# Patient Record
Sex: FEMALE | Race: WHITE
[De-identification: names, ages, dates, MRNs, and addresses within clinical notes are randomized per-mention and may not be internally consistent; named-entity substitution may affect disease eponyms.]

---
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
## 2022-10-17 ENCOUNTER — TELEPHONE (OUTPATIENT)
Dept: OBSTETRICS AND GYNECOLOGY | Facility: CLINIC | Age: 22
End: 2022-10-17

## 2022-10-17 NOTE — TELEPHONE ENCOUNTER
Pt left VM. She states she needs to schedule her annual and she needs to schedule a NOB appt as well.     Attempted to returned pt's call. THAISCB.

## 2022-10-19 ENCOUNTER — OFFICE VISIT (OUTPATIENT)
Dept: OBSTETRICS AND GYNECOLOGY | Facility: CLINIC | Age: 22
End: 2022-10-19

## 2022-10-19 VITALS — SYSTOLIC BLOOD PRESSURE: 100 MMHG | DIASTOLIC BLOOD PRESSURE: 70 MMHG

## 2022-10-19 DIAGNOSIS — Z34.90 PREGNANCY, UNSPECIFIED GESTATIONAL AGE: Primary | ICD-10-CM

## 2022-10-19 PROBLEM — O14.90 PREECLAMPSIA: Status: RESOLVED | Noted: 2021-08-12 | Resolved: 2022-10-19

## 2022-10-19 PROCEDURE — 99213 OFFICE O/P EST LOW 20 MIN: CPT | Performed by: OBSTETRICS & GYNECOLOGY

## 2022-10-19 NOTE — PROGRESS NOTES
Chief Complaint   Patient presents with   • Amenorrhea     + UPT          HPI  Jessica Toussaint is a 22 y.o. female, , who presents with a positive UPT with LMP the first week of August.  She denies missing OCP's and had 2 + pregnancy tests last week.  She has a h/o preeclampsia. She denies n/v, vaginal bleeding, and cramping.     Recent Tests:  She had a urine pregnancy test that was done 1 week ago that was positive..    US today: Yes.  Findings showed viable iup jennifer. 5w6d, FHR of 101.  I have personally evaluated the U/S and agree with the findings.   She has not had prenatal care.  She denies associated abdominal or pelvic pain..  RH:positive       The additional following portions of the patient's history were reviewed and updated as appropriate: allergies, current medications, past family history, past medical history, past social history, past surgical history and problem list.    Review of Systems   Constitutional: Negative.    HENT: Negative.    Eyes: Negative.    Respiratory: Negative.    Cardiovascular: Negative.    Gastrointestinal: Negative.    Endocrine: Negative.    Genitourinary: Negative.    Musculoskeletal: Negative.    Skin: Negative.    Allergic/Immunologic: Negative.    Neurological: Negative.    Hematological: Negative.    Psychiatric/Behavioral: Negative.      All other systems reviewed and are negative.     I have reviewed and agree with the HPI, ROS, and historical information as entered above. Linda Macias MD    Objective   /70   Breastfeeding No     Physical Exam  Vitals and nursing note reviewed.   Constitutional:       Appearance: Normal appearance.   HENT:      Head: Normocephalic and atraumatic.   Eyes:      General: No scleral icterus.     Pupils: Pupils are equal, round, and reactive to light.   Pulmonary:      Effort: Pulmonary effort is normal.      Breath sounds: Normal breath sounds.   Abdominal:      General: Bowel sounds are normal. There is no distension.       Palpations: Abdomen is soft.      Tenderness: There is no abdominal tenderness.   Musculoskeletal:         General: Normal range of motion.      Cervical back: Normal range of motion and neck supple.      Right lower leg: No edema.      Left lower leg: No edema.   Skin:     General: Skin is warm and dry.   Neurological:      General: No focal deficit present.      Mental Status: She is alert and oriented to person, place, and time.   Psychiatric:         Mood and Affect: Mood normal.         Behavior: Behavior normal. Behavior is cooperative.            Assessment and Plan    Problem List Items Addressed This Visit     Pregnancy - Primary    Relevant Orders    US Ob Transvaginal    US Ob Transvaginal       1. Early OB- +UPT at home and lmp in early august. 5w6d today by CRL. Will FU in 2 weeks for repeat US and nob visit.   2. Repeat U/S in 2 week(s).  Return in about 2 weeks (around 11/2/2022) for F/U Prenatal, U/S Next Visit.    I spent 20 minutes caring for Jessica on this date of service. This time includes time spent by me in the following activities:preparing for the visit, reviewing tests, obtaining and/or reviewing a separately obtained history and documenting information in the medical record        Linda Macias MD  10/19/2022

## 2022-11-03 ENCOUNTER — TELEPHONE (OUTPATIENT)
Dept: OBSTETRICS AND GYNECOLOGY | Facility: CLINIC | Age: 22
End: 2022-11-03

## 2022-11-03 ENCOUNTER — HOSPITAL ENCOUNTER (EMERGENCY)
Age: 22
Discharge: HOME | End: 2022-11-03
Payer: MEDICAID

## 2022-11-03 VITALS — HEART RATE: 96 BPM | DIASTOLIC BLOOD PRESSURE: 70 MMHG | SYSTOLIC BLOOD PRESSURE: 113 MMHG

## 2022-11-03 VITALS
TEMPERATURE: 98.4 F | HEART RATE: 96 BPM | DIASTOLIC BLOOD PRESSURE: 81 MMHG | OXYGEN SATURATION: 99 % | RESPIRATION RATE: 18 BRPM | SYSTOLIC BLOOD PRESSURE: 124 MMHG

## 2022-11-03 VITALS
RESPIRATION RATE: 18 BRPM | SYSTOLIC BLOOD PRESSURE: 113 MMHG | HEART RATE: 85 BPM | OXYGEN SATURATION: 96 % | DIASTOLIC BLOOD PRESSURE: 66 MMHG

## 2022-11-03 VITALS
RESPIRATION RATE: 18 BRPM | DIASTOLIC BLOOD PRESSURE: 62 MMHG | HEART RATE: 70 BPM | TEMPERATURE: 98.06 F | SYSTOLIC BLOOD PRESSURE: 112 MMHG | OXYGEN SATURATION: 98 %

## 2022-11-03 VITALS
SYSTOLIC BLOOD PRESSURE: 126 MMHG | RESPIRATION RATE: 17 BRPM | DIASTOLIC BLOOD PRESSURE: 69 MMHG | OXYGEN SATURATION: 96 % | HEART RATE: 68 BPM

## 2022-11-03 VITALS
RESPIRATION RATE: 20 BRPM | DIASTOLIC BLOOD PRESSURE: 68 MMHG | OXYGEN SATURATION: 98 % | HEART RATE: 68 BPM | SYSTOLIC BLOOD PRESSURE: 115 MMHG

## 2022-11-03 VITALS — DIASTOLIC BLOOD PRESSURE: 81 MMHG | HEART RATE: 95 BPM | SYSTOLIC BLOOD PRESSURE: 124 MMHG

## 2022-11-03 VITALS — BODY MASS INDEX: 28 KG/M2

## 2022-11-03 VITALS — SYSTOLIC BLOOD PRESSURE: 110 MMHG | DIASTOLIC BLOOD PRESSURE: 66 MMHG

## 2022-11-03 DIAGNOSIS — O20.0: Primary | ICD-10-CM

## 2022-11-03 DIAGNOSIS — Z3A.01: ICD-10-CM

## 2022-11-03 DIAGNOSIS — O20.9 VAGINAL BLEEDING IN PREGNANCY, FIRST TRIMESTER: Primary | ICD-10-CM

## 2022-11-03 LAB
HCT VFR BLD CALC: 35.2 % (ref 37–47)
HGB BLD-MCNC: 11.8 G/DL (ref 12.2–16.2)
MCHC RBC-ENTMCNC: 33.5 G/DL (ref 31.8–35.4)
MCV RBC: 77.1 FL (ref 81–99)
MEAN CORPUSCULAR HEMOGLOBIN: 25.8 PG (ref 27–31.2)
PLATELET # BLD: 244 K/MM3 (ref 142–424)
RBC # BLD AUTO: 4.56 M/MM3 (ref 4.2–5.4)
WBC # BLD AUTO: 5.4 K/MM3 (ref 4.5–13)

## 2022-11-03 PROCEDURE — 76817 TRANSVAGINAL US OBSTETRIC: CPT

## 2022-11-03 PROCEDURE — 36415 COLL VENOUS BLD VENIPUNCTURE: CPT

## 2022-11-03 PROCEDURE — 86900 BLOOD TYPING SEROLOGIC ABO: CPT

## 2022-11-03 PROCEDURE — 84702 CHORIONIC GONADOTROPIN TEST: CPT

## 2022-11-03 PROCEDURE — 86901 BLOOD TYPING SEROLOGIC RH(D): CPT

## 2022-11-03 PROCEDURE — 85025 COMPLETE CBC W/AUTO DIFF WBC: CPT

## 2022-11-03 PROCEDURE — 99283 EMERGENCY DEPT VISIT LOW MDM: CPT

## 2022-11-03 NOTE — PC.NURSE
rounded on pt in the room asnd asked if they needed anything, pt stated that they had no needs at this time.

## 2022-11-03 NOTE — TELEPHONE ENCOUNTER
Pt transferred from the HUB concerning her ED visit this morning. She states she was seen at  with [facility] 10/19 and was told she was 5w6d. She states that she was seen in the ED due to have some back pain, abdominal pain when picking up her son, and the this morning she had brown/red vaginal discharge. She reports that she had an U/S and was told that she is measuring 6w1d, and that there were no FHT. She states she is still having brown/red discharge, denies it is filling a pad in <1hr and pelvic pain.     Called requested documentation from [facility], states ED note is not finished, but will fax over support documentation.     Attempted to schedule pt in [place] office today or tomorrow, pt states she does not have transportation. Scheduled pt for tomorrow with KATHY Hawkins with U/S tomorrow at 10:30.

## 2022-11-04 ENCOUNTER — OFFICE VISIT (OUTPATIENT)
Dept: OBSTETRICS AND GYNECOLOGY | Facility: CLINIC | Age: 22
End: 2022-11-04

## 2022-11-04 DIAGNOSIS — O02.1 MISSED ABORTION: Primary | ICD-10-CM

## 2022-11-04 PROCEDURE — 99213 OFFICE O/P EST LOW 20 MIN: CPT | Performed by: NURSE PRACTITIONER

## 2022-11-04 RX ORDER — MISOPROSTOL 200 UG/1
TABLET ORAL
Qty: 8 TABLET | Refills: 0 | Status: SHIPPED | OUTPATIENT
Start: 2022-11-04 | End: 2022-11-11 | Stop reason: SDUPTHER

## 2022-11-04 NOTE — PROGRESS NOTES
Vaginal spotting F/U ER        HPI  Jessica Toussaint is a 22 y.o. female, , who presents with spotting and F/U ER visit at [facility].    Recent Tests:    US today: Yes.  Findings showed non viable IUP with absent FHR.  I have personally evaluated the U/S and agree with the findings. Shyanne Brand, KATHY  She has not had prenatal care.  She denies associated abdominal or pelvic pain.. Her past medical history is non-contributory.  She does not have passage of tissue.  Rh Status: Positive  She reports no additional symptoms or complaints.    The additional following portions of the patient's history were reviewed and updated as appropriate: allergies, current medications, past family history, past medical history, past social history, past surgical history and problem list.    Review of Systems   Constitutional: Negative.    Respiratory: Negative.    Cardiovascular: Negative.    Gastrointestinal: Negative.    Genitourinary:        Vaginal spotting     All other systems reviewed and are negative.     I have reviewed and agree with the HPI, ROS, and historical information as entered above. Shyanne Brand, APRN    Objective   There were no vitals taken for this visit.    Physical Exam  Constitutional:       Appearance: Normal appearance.   Pulmonary:      Effort: Pulmonary effort is normal.   Neurological:      Mental Status: She is alert.            Assessment and Plan    Problem List Items Addressed This Visit        Gravid and     Missed  - Primary    Overview     U/S 10/19/22 showed 5w6d IUP with + FHR. U/S 22 showed no growth with absent FHR. Options reviewed, pt opts for cytotec. F/U 1 week with U/S. RH positive.         Relevant Orders    US Ob Transvaginal       1. Missed AB  Repeat U/S in 1 week(s).  Pelvic Rest.  No douching, intercourse or use of tampons.  Options discussed with patient.  Cytotec prescribed.  Bleeding precautions reviewed.  Report to ED if  saturating a pad greater than every 30-60 mins.  Follow Up: Return in about 1 week (around 11/11/2022) for U/S.  Return in about 1 week (around 11/11/2022) for U/S.    Counseling was given to patient for the following topics: diagnostic results, instructions for management, risks and benefits of treatment options and importance of treatment compliance . Total time of the encounter was 20 minutes and 15 minutes was spend counseling.      Shyanne Brand, APRN  11/04/2022

## 2022-11-11 ENCOUNTER — OFFICE VISIT (OUTPATIENT)
Dept: OBSTETRICS AND GYNECOLOGY | Facility: CLINIC | Age: 22
End: 2022-11-11

## 2022-11-11 VITALS
HEIGHT: 66 IN | BODY MASS INDEX: 18 KG/M2 | DIASTOLIC BLOOD PRESSURE: 69 MMHG | SYSTOLIC BLOOD PRESSURE: 124 MMHG | OXYGEN SATURATION: 98 % | RESPIRATION RATE: 18 BRPM | HEART RATE: 88 BPM | WEIGHT: 112 LBS

## 2022-11-11 DIAGNOSIS — O02.1 MISSED ABORTION: Primary | ICD-10-CM

## 2022-11-11 PROCEDURE — 99213 OFFICE O/P EST LOW 20 MIN: CPT | Performed by: NURSE PRACTITIONER

## 2022-11-11 RX ORDER — MISOPROSTOL 200 UG/1
TABLET ORAL
Qty: 4 TABLET | Refills: 0 | Status: SHIPPED | OUTPATIENT
Start: 2022-11-11 | End: 2022-12-07

## 2022-11-11 NOTE — PROGRESS NOTES
"    Chief Complaint   Patient presents with   • Miscarriage          HPI  Jessica Toussaint is a 22 y.o. female, , who presents with miscarriage.    Recent Tests:  She missed AB.    US today: Yes.  Findings showed no definite GS, endometrium thickened, probable RPOC.  I have personally evaluated the U/S and agree with the findings. Shyanne Brand, APRN  She has not had prenatal care.  She denies associated abdominal or pelvic pain.. Her past medical history is non-contributory.  She has passage of tissue.  Rh Status: Positive  She reports no additional symptoms or complaints.    The additional following portions of the patient's history were reviewed and updated as appropriate: allergies, current medications, past family history, past medical history, past social history, past surgical history and problem list.    Review of Systems   Constitutional: Negative.    Respiratory: Negative.    Cardiovascular: Negative.    Gastrointestinal: Negative.    Genitourinary: Positive for vaginal bleeding.     All other systems reviewed and are negative.     I have reviewed and agree with the HPI, ROS, and historical information as entered above. Shyanne Brand, APRN    Objective   /69   Pulse 88   Resp 18   Ht 167.6 cm (65.98\")   Wt 50.8 kg (112 lb)   SpO2 98%   BMI 18.09 kg/m²     Physical Exam  Constitutional:       Appearance: Normal appearance.   Pulmonary:      Effort: Pulmonary effort is normal.   Neurological:      Mental Status: She is alert.            Assessment and Plan    Problem List Items Addressed This Visit        Gravid and     Missed  - Primary    Overview     U/S 10/19/22 showed 5w6d IUP with + FHR. U/S 22 showed no growth with absent FHR. Options reviewed, pt opts for cytotec. F/U 1 week with U/S. RH positive.  F/U U/S- possible RPOC but no definitive GS. She only did one dose of cytotec. Plan to repeat that this weekend and discussed taking 2nd dose if unsure that " everything passed. HCG today, repeat U/S 1 week.          Relevant Orders    HCG, B-subunit, Quantitative    US Ob Transvaginal       1. Missed AB  2. Repeat U/S in 1 week(s).  3. Pelvic Rest.  No douching, intercourse or use of tampons.  4. Call for an increase in bleeding, abdominal pain, or fever.  5. Cytotec prescribed.  Bleeding precautions reviewed.  6. Report to ED if saturating a pad greater than every 30-60 mins.  Return in about 1 week (around 11/18/2022) for U/S MAB F/U.    Counseling was given to patient for the following topics: diagnostic results, instructions for management, risk factor reductions, prognosis, risks and benefits of treatment options and importance of treatment compliance . Total time of the encounter was 20 minutes and 15 minutes was spend counseling.      Shyanne Brand, APRN  11/11/2022

## 2022-11-12 LAB — HCG INTACT+B SERPL-ACNC: NORMAL MIU/ML

## 2022-11-17 ENCOUNTER — TELEPHONE (OUTPATIENT)
Dept: OBSTETRICS AND GYNECOLOGY | Facility: CLINIC | Age: 22
End: 2022-11-17

## 2022-11-17 ENCOUNTER — DOCUMENTATION (OUTPATIENT)
Dept: OBSTETRICS AND GYNECOLOGY | Facility: CLINIC | Age: 22
End: 2022-11-17

## 2022-11-17 NOTE — TELEPHONE ENCOUNTER
Attempted to call pt concerning rescheduling her appt. Phone number not in service. Unable to leave .

## 2022-11-17 NOTE — PROGRESS NOTES
Tried to call pt number is not working tried to call significant other no answer no VM need to reschedule missed appt

## 2022-11-18 NOTE — TELEPHONE ENCOUNTER
Attempted to call pt x2. 'This number you dialed is not a working number.'     Letter sent to patient at address on file.

## 2022-12-06 ENCOUNTER — HOSPITAL ENCOUNTER (EMERGENCY)
Age: 22
Discharge: HOME | End: 2022-12-06
Payer: MEDICAID

## 2022-12-06 VITALS
HEART RATE: 87 BPM | OXYGEN SATURATION: 98 % | RESPIRATION RATE: 19 BRPM | TEMPERATURE: 98.4 F | DIASTOLIC BLOOD PRESSURE: 74 MMHG | SYSTOLIC BLOOD PRESSURE: 121 MMHG

## 2022-12-06 VITALS
DIASTOLIC BLOOD PRESSURE: 74 MMHG | RESPIRATION RATE: 19 BRPM | TEMPERATURE: 98.42 F | HEART RATE: 87 BPM | OXYGEN SATURATION: 98 % | SYSTOLIC BLOOD PRESSURE: 121 MMHG

## 2022-12-06 VITALS — BODY MASS INDEX: 20.7 KG/M2

## 2022-12-06 DIAGNOSIS — H10.9: Primary | ICD-10-CM

## 2022-12-06 PROCEDURE — 99212 OFFICE O/P EST SF 10 MIN: CPT

## 2022-12-07 ENCOUNTER — OFFICE VISIT (OUTPATIENT)
Dept: OBSTETRICS AND GYNECOLOGY | Facility: CLINIC | Age: 22
End: 2022-12-07

## 2022-12-07 VITALS
BODY MASS INDEX: 19.95 KG/M2 | WEIGHT: 112.6 LBS | HEIGHT: 63 IN | SYSTOLIC BLOOD PRESSURE: 110 MMHG | DIASTOLIC BLOOD PRESSURE: 54 MMHG

## 2022-12-07 DIAGNOSIS — O02.1 MISSED ABORTION: Primary | ICD-10-CM

## 2022-12-07 PROBLEM — H10.021: Status: ACTIVE | Noted: 2022-12-07

## 2022-12-07 PROCEDURE — 99213 OFFICE O/P EST LOW 20 MIN: CPT | Performed by: NURSE PRACTITIONER

## 2022-12-07 PROCEDURE — 3008F BODY MASS INDEX DOCD: CPT | Performed by: NURSE PRACTITIONER

## 2022-12-07 PROCEDURE — 2014F MENTAL STATUS ASSESS: CPT | Performed by: NURSE PRACTITIONER

## 2022-12-07 RX ORDER — POLYMYXIN B SULFATE AND TRIMETHOPRIM 1; 10000 MG/ML; [USP'U]/ML
SOLUTION OPHTHALMIC
COMMUNITY
Start: 2022-12-06

## 2022-12-07 NOTE — PROGRESS NOTES
Chief Complaint   Patient presents with   • Follow-up     MAB      Subjective   HPI  Jessica Toussaint is a 22 y.o. female,  who presents for follow up on MAB.     Patient came in for U/S on 10/19/2022 and U/S showed patient was 5w6d with cardiac activity present. Patient came in on 2022 for follow up U/S with vaginal bleeding, and US showed no growth since last U/S and no cardiac activity. Patient was given 2 Cytotec pills.  She had heavy bleeding with the first dose, so she did not take her second dose.      Patient came in for follow up MAB on 2022 s/p Cytotec and U/S revealed that RPOC could not be ruled out. Patient reports taking the 2nd pill that she was initially given - had light bleeding, and she took a second dose 24 hours later with heavy bleeding . Patient presents today for another follow up U/S MAB.     Additional OB/GYN History     Last Pap : Never       Last mammogram: Never       Tobacco Usage?: No   OB History        2    Para   1    Term   1            AB        Living   1       SAB        IAB        Ectopic        Molar        Multiple   0    Live Births   1                Current Outpatient Medications:   •  trimethoprim-polymyxin b (POLYTRIM) 05722-8.1 UNIT/ML-% ophthalmic solution, , Disp: , Rfl:      Past Medical History:   Diagnosis Date   • Scoliosis         Past Surgical History:   Procedure Laterality Date   •  SECTION N/A 2021    Procedure:  SECTION PRIMARY;  Surgeon: Irene Mccray MD;  Location: [facility] LABOR DELIVERY;  Service: Obstetrics/Gynecology;  Laterality: N/A;   • WISDOM TOOTH EXTRACTION         The additional following portions of the patient's history were reviewed and updated as appropriate: allergies and current medications.    Review of Systems   Constitutional: Negative for fever.   Eyes: Positive for pain, redness and itching.        Patient has been diagnosed with pink eye in her right eye (being treated  "from other provider)   Genitourinary: Negative for pelvic pain and vaginal bleeding.   All other systems reviewed and are negative.      I have reviewed and agree with the HPI, ROS, and historical information as entered above. Cherelle Swain, APRN    Objective   /54 (BP Location: Left arm, Patient Position: Sitting, Cuff Size: Adult)   Ht 160 cm (63\")   Wt 51.1 kg (112 lb 9.6 oz)   LMP  (LMP Unknown)   Breastfeeding No   BMI 19.95 kg/m²     Physical Exam  Vitals and nursing note reviewed.   Constitutional:       General: She is not in acute distress.     Appearance: Normal appearance. She is normal weight. She is not ill-appearing.   Pulmonary:      Effort: Pulmonary effort is normal. No respiratory distress.   Abdominal:      General: There is no distension.      Palpations: Abdomen is soft. There is no mass.      Tenderness: There is no abdominal tenderness. There is no guarding or rebound.      Hernia: No hernia is present.   Skin:     General: Skin is warm and dry.   Neurological:      Mental Status: She is alert and oriented to person, place, and time.   Psychiatric:         Mood and Affect: Mood normal.         Behavior: Behavior normal.         Assessment & Plan     Assessment     Problem List Items Addressed This Visit        Gravid and     Missed  - Primary    Overview     U/S 10/19/22 showed 5w6d IUP with + FHR. U/S 22 showed no growth with absent FHR. Options reviewed, pt opts for cytotec. F/U 1 week with U/S. RH positive.  F/U U/S- possible RPOC but no definitive GS. She only did one dose of cytotec. Plan to repeat that this weekend and discussed taking 2nd dose if unsure that everything passed. HCG today, repeat U/S 1 week.           Plan   1. Pelvic ultrasound today after 1 month of attempting to pass MAB with outpatient treatment.  2. 2cm tissue remains in uterus with blood flow.  3. Reviewed ultrasound with Dr. Macias; recommends D&C.  She will check work schedule and " contact us to schedule---- for next week.    Scribed for KATHY Bolanos by Lisa CHAVEZ. 12/7/2022  17:49 EST   I, KATHY Bolanos, personally performed the services described in this documentation as scribed by the above named individual in my presence, and it is both accurate and complete.  12/7/2022  17:49 EST  KATHY Bolanos  12/07/2022

## 2022-12-08 ENCOUNTER — TELEPHONE (OUTPATIENT)
Dept: OBSTETRICS AND GYNECOLOGY | Facility: CLINIC | Age: 22
End: 2022-12-08

## 2022-12-08 NOTE — TELEPHONE ENCOUNTER
UNABLE TO WARM KEE     Caller: Jessica Toussaint    Relationship to patient: Self    Best call back number: [phone]    Type of visit: D&C SCHEDULING    Additional notes:PT CALLED BACK TO SCHEDULE D&C, UNABLE TO WARM KEE. PLS CALL PT BACK.

## 2022-12-08 NOTE — TELEPHONE ENCOUNTER
Caller: Jessica Toussaint    Relationship to patient: Self    Best call back number: [phone]    Chief complaint: DR WANTS PT TO HAVE A D&C - CALLING TO SCHEDULE FOR Tuesday AM    Type of visit: PROCEDURE    Requested date: Tuesday, December 13TH     If rescheduling, when is the original appointment: N/A     Additional notes:PT IS CALLING TO GET D&C SCHEDULED FOR Tuesday, December 13TH - HUB UNABLE TO W/T - PLEASE CALL PT BACK TO GET SCHEDULED

## 2022-12-08 NOTE — TELEPHONE ENCOUNTER
Unable to leave message, no voicemail box set up. The opening is at 1pm next Tuesday and Dr. Macias is in clinic just waiting to confirm that is ok.

## 2022-12-12 ENCOUNTER — TELEPHONE (OUTPATIENT)
Dept: OBSTETRICS AND GYNECOLOGY | Facility: CLINIC | Age: 22
End: 2022-12-12

## 2022-12-12 NOTE — TELEPHONE ENCOUNTER
Per Dr. Macias: patient needs to monitor her s/s, call our office in the morning and if she is still actively vomiting and/or having diarrhea and/or with a temperature , will need to R/S surgery.     S/w pt she v/u

## 2022-12-12 NOTE — TELEPHONE ENCOUNTER
Caller: Jessica Toussaint    Relationship to patient: Self    Best call back number: [phone]    Patient is needing: PT HAS D&C SCHEDULE W/DR. ACOSTA FOR TOMORROW MORNING - HOWEVER, PT IS EXPERIENCING STOMACH FLU SYMPTOMS, NUEASA, VOMITING, DIAHREAH - SHOULD PT RESCHEDULE??    PLEASE CALL PT BACK AS SOON AS POSSIBLE TO DISCUSS    OK TO LEAVE VM IF NO ANSWER.

## 2022-12-12 NOTE — TELEPHONE ENCOUNTER
Dr. Macias pt.     S/w pt she states she has D&C tomorrow and is currently having vomiting and diarrhea and low grade fever that has been 99.2 today and was wondering if we needed to cancel surgery for tomorrow.     I told patient I would discuss this with Dr. Macias and CB with plan of care. She v/u

## 2022-12-13 ENCOUNTER — OUTSIDE FACILITY SERVICE (OUTPATIENT)
Dept: OBSTETRICS AND GYNECOLOGY | Facility: CLINIC | Age: 22
End: 2022-12-13

## 2022-12-13 ENCOUNTER — TELEPHONE (OUTPATIENT)
Dept: OBSTETRICS AND GYNECOLOGY | Facility: CLINIC | Age: 22
End: 2022-12-13

## 2022-12-13 PROCEDURE — 88305 TISSUE EXAM BY PATHOLOGIST: CPT

## 2022-12-13 PROCEDURE — 59812 TREATMENT OF MISCARRIAGE: CPT | Performed by: OBSTETRICS & GYNECOLOGY

## 2022-12-13 NOTE — TELEPHONE ENCOUNTER
Provider: DR KARLA Neville: BARRON HOLMAN  Relationship to Patient: SELF    Reason for Call: PT IS FEELING WELL TODAY AND FEVER FREE. SHE WANTS TO PROCEED WITH THE D&C TODAY.

## 2022-12-14 ENCOUNTER — LAB REQUISITION (OUTPATIENT)
Dept: LAB | Facility: HOSPITAL | Age: 22
End: 2022-12-14
Payer: COMMERCIAL

## 2022-12-14 ENCOUNTER — TELEPHONE (OUTPATIENT)
Dept: OBSTETRICS AND GYNECOLOGY | Facility: CLINIC | Age: 22
End: 2022-12-14

## 2022-12-14 NOTE — TELEPHONE ENCOUNTER
She said she did talk to the surgery center today. As of 4pm there is no new info to given he pt regarding missed call.

## 2022-12-14 NOTE — TELEPHONE ENCOUNTER
Dr. Macias patient  S/p suction D&C on 12/13/2022 for IAB    Informed patient that I do not see a phone note or encounter and that I will forward this to Dr. Macias's nurse. She v/u.

## 2022-12-14 NOTE — TELEPHONE ENCOUNTER
Caller: Jessica Toussaint    Relationship to patient: Self    Best call back number:[phone]    Patient is needing: PATIENT RETURNING CALL. AVAILABLE FOR CALL BACK.

## 2022-12-15 LAB — REF LAB TEST METHOD: NORMAL

## 2023-01-04 ENCOUNTER — OFFICE VISIT (OUTPATIENT)
Dept: OBSTETRICS AND GYNECOLOGY | Facility: CLINIC | Age: 23
End: 2023-01-04
Payer: COMMERCIAL

## 2023-01-04 VITALS
SYSTOLIC BLOOD PRESSURE: 98 MMHG | HEIGHT: 63 IN | WEIGHT: 112 LBS | DIASTOLIC BLOOD PRESSURE: 60 MMHG | BODY MASS INDEX: 19.84 KG/M2

## 2023-01-04 DIAGNOSIS — O03.9 MISCARRIAGE: Primary | ICD-10-CM

## 2023-01-04 PROCEDURE — 1160F RVW MEDS BY RX/DR IN RCRD: CPT | Performed by: OBSTETRICS & GYNECOLOGY

## 2023-01-04 PROCEDURE — 1159F MED LIST DOCD IN RCRD: CPT | Performed by: OBSTETRICS & GYNECOLOGY

## 2023-01-04 PROCEDURE — 99024 POSTOP FOLLOW-UP VISIT: CPT | Performed by: OBSTETRICS & GYNECOLOGY

## 2023-01-04 NOTE — PROGRESS NOTES
OBGYN Postoperative Exam Note          Subjective   Chief Complaint   Patient presents with   • Post-op     Jessica Toussaint is a 22 y.o. year old  presenting to be seen for her post-operative visit. She is S/P suction D & C on 2022 at [facility] for missed . Currently she reports no problems with eating, bowel movements, voiding, or wound drainage and pain is well controlled.    path: POC    OTHER THINGS SHE WANTS TO DISCUSS TODAY:  Nothing else      Current Outpatient Medications:   •  trimethoprim-polymyxin b (POLYTRIM) 97842-6.1 UNIT/ML-% ophthalmic solution, , Disp: , Rfl:      Past Medical History:   Diagnosis Date   • Miscarriage    • Scoliosis         Past Surgical History:   Procedure Laterality Date   •  SECTION N/A 2021    Procedure:  SECTION PRIMARY;  Surgeon: Irene Mccray MD;  Location: [facility] LABOR DELIVERY;  Service: Obstetrics/Gynecology;  Laterality: N/A;   • DILATATION AND CURETTAGE     • WISDOM TOOTH EXTRACTION         The following portions of the patient's history were reviewed and updated as appropriate:current medications and allergies    Review of Systems   Constitutional: Negative for fever.   Eyes: Negative for blurred vision.   Cardiovascular: Negative for leg swelling.   Gastrointestinal: Negative for nausea and vomiting.   Genitourinary: Negative for vaginal bleeding and vaginal discharge.   Neurological: Negative for headache.          Objective   BP 98/60   Ht 160 cm (63\")   Wt 50.8 kg (112 lb)   LMP  (LMP Unknown)   BMI 19.84 kg/m²     Physical Exam  Vitals and nursing note reviewed.   Constitutional:       Appearance: Normal appearance.   HENT:      Head: Normocephalic and atraumatic.   Eyes:      General: No scleral icterus.     Pupils: Pupils are equal, round, and reactive to light.   Pulmonary:      Effort: Pulmonary effort is normal.      Breath sounds: Normal breath sounds.   Abdominal:      General: Bowel sounds are normal. There  is no distension.      Palpations: Abdomen is soft.      Tenderness: There is no abdominal tenderness.   Musculoskeletal:         General: Normal range of motion.      Cervical back: Normal range of motion and neck supple.      Right lower leg: No edema.      Left lower leg: No edema.   Skin:     General: Skin is warm and dry.   Neurological:      General: No focal deficit present.      Mental Status: She is alert and oriented to person, place, and time.   Psychiatric:         Mood and Affect: Mood normal.         Behavior: Behavior normal. Behavior is cooperative.              Assessment   1. S/P suction D & C     Plan   1. May return to full activity with no restrictions  2. She will use condoms for now, discused contraceptive options, she will call with decision.   3. The importance of keeping all planned follow-up and taking all medications as prescribed was emphasized.  4. No follow-ups on file.              Linda Macias MD  01/04/2023

## 2023-03-08 ENCOUNTER — TELEPHONE (OUTPATIENT)
Dept: OBSTETRICS AND GYNECOLOGY | Facility: CLINIC | Age: 23
End: 2023-03-08
Payer: COMMERCIAL

## 2023-03-08 NOTE — TELEPHONE ENCOUNTER
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 Her son is about 29 lbs and she is wanting to know if it is ok that she still lift/carry him.

## 2023-03-08 NOTE — TELEPHONE ENCOUNTER
Pt states she does need any paperwork stating that she cannot lift she just wanted to make sure she is ok to lift her son who is 29lbs. I let her know she can lift up 35 lbs. She VU

## 2023-03-16 ENCOUNTER — HOSPITAL ENCOUNTER (EMERGENCY)
Age: 23
Discharge: HOME | End: 2023-03-16
Payer: MEDICAID

## 2023-03-16 VITALS
DIASTOLIC BLOOD PRESSURE: 67 MMHG | HEART RATE: 78 BPM | TEMPERATURE: 97.88 F | OXYGEN SATURATION: 100 % | SYSTOLIC BLOOD PRESSURE: 123 MMHG | RESPIRATION RATE: 16 BRPM

## 2023-03-16 VITALS
OXYGEN SATURATION: 100 % | TEMPERATURE: 97.9 F | HEART RATE: 78 BPM | SYSTOLIC BLOOD PRESSURE: 123 MMHG | RESPIRATION RATE: 16 BRPM | DIASTOLIC BLOOD PRESSURE: 67 MMHG

## 2023-03-16 VITALS — BODY MASS INDEX: 19.8 KG/M2

## 2023-03-16 DIAGNOSIS — T43.205A: ICD-10-CM

## 2023-03-16 DIAGNOSIS — O26.891: Primary | ICD-10-CM

## 2023-03-16 DIAGNOSIS — R42: ICD-10-CM

## 2023-03-16 DIAGNOSIS — Z3A.01: ICD-10-CM

## 2023-03-16 LAB
COLOR UR: (no result)
MICRO URNS: (no result)
PH UR: 6.5 [PH] (ref 5–8.5)
PROT UR STRIP-MCNC: (no result) MG/DL
SP GR UR: 1.02 (ref 1–1.03)
UROBILINOGEN UR QL: 1 EU/DL

## 2023-03-16 PROCEDURE — 99283 EMERGENCY DEPT VISIT LOW MDM: CPT

## 2023-03-16 PROCEDURE — 99282 EMERGENCY DEPT VISIT SF MDM: CPT

## 2023-03-16 PROCEDURE — 81001 URINALYSIS AUTO W/SCOPE: CPT

## 2023-03-16 PROCEDURE — 81025 URINE PREGNANCY TEST: CPT

## 2023-04-07 ENCOUNTER — HOSPITAL ENCOUNTER (EMERGENCY)
Age: 23
Discharge: STILL ADMITTED OR ADMINISTRATIVE | End: 2023-04-07
Payer: MEDICAID

## 2023-04-07 VITALS
RESPIRATION RATE: 16 BRPM | SYSTOLIC BLOOD PRESSURE: 130 MMHG | OXYGEN SATURATION: 100 % | HEART RATE: 82 BPM | TEMPERATURE: 98.4 F | DIASTOLIC BLOOD PRESSURE: 86 MMHG

## 2023-04-07 VITALS
TEMPERATURE: 98.42 F | SYSTOLIC BLOOD PRESSURE: 130 MMHG | DIASTOLIC BLOOD PRESSURE: 86 MMHG | HEART RATE: 82 BPM | OXYGEN SATURATION: 100 % | RESPIRATION RATE: 16 BRPM

## 2023-04-07 VITALS — BODY MASS INDEX: 19.8 KG/M2

## 2023-04-07 DIAGNOSIS — O20.9: Primary | ICD-10-CM

## 2023-04-07 DIAGNOSIS — Z3A.09: ICD-10-CM

## 2023-04-07 DIAGNOSIS — O23.41: ICD-10-CM

## 2023-04-07 LAB
BACTERIA URNS QL MICRO: (no result) /LPF
COLOR UR: YELLOW
LEUKOCYTE ESTERASE UR QL STRIP: (no result)
MICRO URNS: (no result)
PH UR: 7 [PH] (ref 5–8.5)
SP GR UR: 1.02 (ref 1–1.03)
SQUAMOUS URNS QL MICRO: (no result) #/HPF (ref 0–5)
UROBILINOGEN UR QL: 0.2 EU/DL
WBC # UR: (no result) #/HPF (ref 0–3)

## 2023-04-07 PROCEDURE — 81025 URINE PREGNANCY TEST: CPT

## 2023-04-07 PROCEDURE — 99284 EMERGENCY DEPT VISIT MOD MDM: CPT

## 2023-04-07 PROCEDURE — 99285 EMERGENCY DEPT VISIT HI MDM: CPT

## 2023-04-07 PROCEDURE — 84702 CHORIONIC GONADOTROPIN TEST: CPT

## 2023-04-07 PROCEDURE — 76817 TRANSVAGINAL US OBSTETRIC: CPT

## 2023-04-07 PROCEDURE — 81001 URINALYSIS AUTO W/SCOPE: CPT

## 2023-04-07 PROCEDURE — 87086 URINE CULTURE/COLONY COUNT: CPT

## 2023-04-07 NOTE — PC.NURSE
attempted to call pt for u/s results. voicemail left. Methotrexate Counseling:  Patient counseled regarding adverse effects of methotrexate including but not limited to nausea, vomiting, abnormalities in liver function tests. Patients may develop mouth sores, rash, diarrhea, and abnormalities in blood counts. The patient understands that monitoring is required including LFT's and blood counts.  There is a rare possibility of scarring of the liver and lung problems that can occur when taking methotrexate. Persistent nausea, loss of appetite, pale stools, dark urine, cough, and shortness of breath should be reported immediately. Patient advised to discontinue methotrexate treatment at least three months before attempting to become pregnant.  I discussed the need for folate supplements while taking methotrexate.  These supplements can decrease side effects during methotrexate treatment. The patient verbalized understanding of the proper use and possible adverse effects of methotrexate.  All of the patient's questions and concerns were addressed.

## 2023-04-11 ENCOUNTER — TELEPHONE (OUTPATIENT)
Dept: OBSTETRICS AND GYNECOLOGY | Facility: CLINIC | Age: 23
End: 2023-04-11
Payer: COMMERCIAL

## 2023-04-11 NOTE — TELEPHONE ENCOUNTER
Pt went to ER on Friday for light pink discharge they took labs, urine and pt was given medication for UTI.     ER told pt to call gyn office and let us know

## 2023-04-13 ENCOUNTER — TELEPHONE (OUTPATIENT)
Dept: OBSTETRICS AND GYNECOLOGY | Facility: CLINIC | Age: 23
End: 2023-04-13

## 2023-04-13 RX ORDER — ASCORBIC ACID, BIOTIN, CHOLECALCIFEROL, CYANOCOBALAMIN, FOLIC ACID, FERROUS ASPARTO GLYCINATE, POTASSIUM IODIDE, PYRIDOXINE HYDROCHLORIDE, .ALPHA.-TOCOPHEROL ACETATE, DL-, DOCUSATE SODIUM AND BLUEBERRY 30; 75; 500; 13; 400; 10; 150; 5; 10; 200; 5; 600 MG/1; UG/1; [IU]/1; UG/1; UG/1; MG/1; UG/1; MG/1; [IU]/1; MG/1; MG/1; UG/1
200 TABLET, FILM COATED ORAL DAILY
Qty: 30 CAPSULE | Refills: 5 | Status: SHIPPED | OUTPATIENT
Start: 2023-04-13

## 2023-04-13 RX ORDER — ASCORBIC ACID, BIOTIN, CHOLECALCIFEROL, CYANOCOBALAMIN, FOLIC ACID, FERROUS ASPARTO GLYCINATE, POTASSIUM IODIDE, PYRIDOXINE HYDROCHLORIDE, .ALPHA.-TOCOPHEROL ACETATE, DL-, DOCUSATE SODIUM AND BLUEBERRY 30; 75; 500; 13; 400; 10; 150; 5; 10; 200; 5; 600 MG/1; UG/1; [IU]/1; UG/1; UG/1; MG/1; UG/1; MG/1; [IU]/1; MG/1; MG/1; UG/1
200 TABLET, FILM COATED ORAL DAILY
COMMUNITY
Start: 2023-04-13 | End: 2023-04-13 | Stop reason: SDUPTHER

## 2023-04-13 NOTE — TELEPHONE ENCOUNTER
PT IS REQUESTING A PRESCRIPTION FOR PRENATAL VITAMINS BE SENT TO [facility]. PLEASE ADVISE PT.

## 2023-05-03 ENCOUNTER — INITIAL PRENATAL (OUTPATIENT)
Dept: OBSTETRICS AND GYNECOLOGY | Facility: CLINIC | Age: 23
End: 2023-05-03
Payer: COMMERCIAL

## 2023-05-03 VITALS — BODY MASS INDEX: 20.55 KG/M2 | DIASTOLIC BLOOD PRESSURE: 70 MMHG | SYSTOLIC BLOOD PRESSURE: 110 MMHG | WEIGHT: 116 LBS

## 2023-05-03 DIAGNOSIS — Z87.59 HISTORY OF MISCARRIAGE: ICD-10-CM

## 2023-05-03 DIAGNOSIS — Z34.91 INITIAL OBSTETRIC VISIT IN FIRST TRIMESTER: Primary | ICD-10-CM

## 2023-05-03 DIAGNOSIS — O09.299 HISTORY OF MISCARRIAGE, CURRENTLY PREGNANT: ICD-10-CM

## 2023-05-03 PROBLEM — H10.021: Status: RESOLVED | Noted: 2022-12-07 | Resolved: 2023-05-03

## 2023-05-03 PROBLEM — Z98.891 PREVIOUS CESAREAN SECTION: Status: ACTIVE | Noted: 2023-05-03

## 2023-05-03 PROBLEM — O09.899 PREVIOUS PREGNANCY COMPLICATED BY PREGNANCY-INDUCED HYPERTENSION, ANTEPARTUM: Status: ACTIVE | Noted: 2023-05-03

## 2023-05-03 PROBLEM — O02.1 MISSED ABORTION: Status: RESOLVED | Noted: 2022-11-04 | Resolved: 2023-05-03

## 2023-05-03 RX ORDER — PRENATAL VIT/IRON FUM/FOLIC AC 27MG-0.8MG
TABLET ORAL
COMMUNITY
Start: 2023-04-13

## 2023-05-03 NOTE — PROGRESS NOTES
Initial ob visit     CC- Here for care of pregnancy        Jessica Toussaint is a 22 y.o. female, , who presents for her first obstetrical visit.  Her last LMP was Patient's last menstrual period was 2023.. Her AIDEE is 2023, by Ultrasound. Current GA is 13w4d.     Initial positive test date : 3-7-2023, UPT        Her periods are: every 4 weeks  Prior obstetric issues: pre-eclampsia, c/s  Patient's past medical history is significant for: n/a.  Family history of genetic issues (includes FOB): FOB IDM  Prior infections concerning in pregnancy (Rash, fever in last 2 weeks): No  Varicella Hx - vaccinated  Prior testing for Cystic Fibrosis Carrier or Sickle Cell Trait- no  Prepregnancy BMI - Body mass index is 20.55 kg/m².  History of STD: no  Hx of HSV for patient or partner: no  Ultrasound Today: yes; viable iup jennifer. 13w4d    OB History    Para Term  AB Living   3 1 1   1 1   SAB IAB Ectopic Molar Multiple Live Births   1       0 1      # Outcome Date GA Lbr Sher/2nd Weight Sex Delivery Anes PTL Lv   3 Current            2 Term 23 37w5d  3096 g (6 lb 13.2 oz) M CS-LTranv EPI N GERALDO      Complications: Failure to Progress in Second Stage   1 SAB                Additional Pertinent History   Last Pap : never      Last Completed Pap Smear     This patient has no relevant Health Maintenance data.          Family history of uterine, colon, breast, or ovarian cancer: no  Feelings of Anxiety or Depression: yes - anxiety; self managed  Tobacco Usage?: No   Alcohol/Drug Use?: NO  Over the age of 35 at delivery: no  Desires Genetic Screening: Cell Free DNA      PMH    Current Outpatient Medications:   •  Prenat-FeAsp-Meth-FA-DHA w/o A (Prenate Pixie) 10-0.6-0.4-200 MG capsule, Take 200 mg by mouth Daily., Disp: 30 capsule, Rfl: 5  •  Prenatal Vit-Fe Fumarate-FA (prenatal vitamin 27-0.8) 27-0.8 MG tablet tablet, , Disp: , Rfl:      Past Medical History:   Diagnosis Date   • Miscarriage    •  Scoliosis         Past Surgical History:   Procedure Laterality Date   •  SECTION N/A 2021    Procedure:  SECTION PRIMARY;  Surgeon: Irene Mccray MD;  Location: [facility] LABOR DELIVERY;  Service: Obstetrics/Gynecology;  Laterality: N/A;   • DILATATION AND CURETTAGE     • WISDOM TOOTH EXTRACTION         Review of Systems   Review of Systems  Patient Reports: Nausea and vomiting (scant pink spotting @ 9wks, she was evaluated at [facility] and eval. Was wnl  Patient Denies: Spotting and Cramping  All systems reviewed and otherwise normal.    I have reviewed and agree with the HPI, ROS, and historical information as entered above. Linda Macias MD    /70   Wt 52.6 kg (116 lb)   LMP 2023   Breastfeeding Unknown   BMI 20.55 kg/m²     The additional following portions of the patient's history were reviewed and updated as appropriate: allergies, current medications, past family history, past medical history, past social history, past surgical history and problem list.    Physical Exam  General:  well developed; well nourished  no acute distress   Chest/Respiratory: No labored breathing, normal respiratory effort, normal appearance, no respiratory noises noted   Heart:  not examined   Thyroid: not examined   Breasts:  Not performed.   Abdomen: soft, non-tender; no masses  no umbilical or inguinal hernias are present  no hepato-splenomegaly   Pelvis: Not performed.        Assessment and Plan    Problem List Items Addressed This Visit    None  Visit Diagnoses     Initial obstetric visit in first trimester    -  Primary    Relevant Orders    Obstetric Panel    Chlamydia trachomatis, Neisseria gonorrhoeae, PCR w/ confirmation - Urine, Urine, Clean Catch    HIV-1 / O / 2 Ag / Antibody 4th Generation    Urinalysis With Microscopic - Urine, Clean Catch    Urine Culture - Urine, Urine, Clean Catch    Urine Drug Screen - Urine, Clean Catch    Preeclampsia Panel    History of miscarriage        History  of miscarriage, currently pregnant        Relevant Orders    [id number] PLUS Core (chr21,18,13,sex) - Blood,          1. Pregnancy at 13w4d  2. Reviewed routine prenatal care with the office and educational materials given  3. Lab(s) Ordered  4. Discussed options for genetic testing including first trimester nuchal translucency screen, genetic disease carrier testing, quadruple screen, and NIPT  5. Patient is on Prenatal vitamins  6. Activity recommendation : 150 minutes/week of moderate intensity aerobic activity unless we limit for bleeding, hypertension or other pregnancy complication   7. discussed baby aspirin from 10-36 weeks for prevention of preeclampsia   8. baseline 24 hour urine protein recommended. Instructions and equipment given to return sample at next visit.  9. baseline PEP today  Return in about 1 month (around 6/3/2023) for F/U Prenatal.      Linda Macias MD  05/03/2023

## 2023-05-04 LAB
ABO GROUP BLD: NORMAL
ALP SERPL-CCNC: 65 IU/L (ref 42–106)
ALT SERPL-CCNC: 19 IU/L (ref 0–32)
AST SERPL-CCNC: 20 IU/L (ref 0–40)
BASOPHILS # BLD AUTO: 0 X10E3/UL (ref 0–0.2)
BASOPHILS NFR BLD AUTO: 0 %
BILIRUB SERPL-MCNC: 0.2 MG/DL (ref 0–1.2)
BLD GP AB SCN SERPL QL: NEGATIVE
CREAT SERPL-MCNC: 0.52 MG/DL (ref 0.57–1)
EGFRCR SERPLBLD CKD-EPI 2021: 136 ML/MIN/1.73
EOSINOPHIL # BLD AUTO: 0.2 X10E3/UL (ref 0–0.4)
EOSINOPHIL NFR BLD AUTO: 3 %
ERYTHROCYTE [DISTWIDTH] IN BLOOD BY AUTOMATED COUNT: 15 % (ref 11.7–15.4)
HBV SURFACE AG SERPL QL IA: NEGATIVE
HCT VFR BLD AUTO: 36.6 % (ref 34–46.6)
HCV IGG SERPL QL IA: NON REACTIVE
HGB BLD-MCNC: 11.9 G/DL (ref 11.1–15.9)
HIV 1+2 AB+HIV1 P24 AG SERPL QL IA: NON REACTIVE
IMM GRANULOCYTES # BLD AUTO: 0 X10E3/UL (ref 0–0.1)
IMM GRANULOCYTES NFR BLD AUTO: 0 %
LDH SERPL L TO P-CCNC: 205 IU/L (ref 119–226)
LYMPHOCYTES # BLD AUTO: 1.6 X10E3/UL (ref 0.7–3.1)
LYMPHOCYTES NFR BLD AUTO: 27 %
MCH RBC QN AUTO: 23.7 PG (ref 26.6–33)
MCHC RBC AUTO-ENTMCNC: 32.5 G/DL (ref 31.5–35.7)
MCV RBC AUTO: 73 FL (ref 79–97)
MED LIST OPTION NOT SELECTED: NORMAL
MONOCYTES # BLD AUTO: 0.5 X10E3/UL (ref 0.1–0.9)
MONOCYTES NFR BLD AUTO: 8 %
NEUTROPHILS # BLD AUTO: 3.6 X10E3/UL (ref 1.4–7)
NEUTROPHILS NFR BLD AUTO: 62 %
PLATELET # BLD AUTO: 281 X10E3/UL (ref 150–450)
RBC # BLD AUTO: 5.02 X10E6/UL (ref 3.77–5.28)
RH BLD: POSITIVE
RPR SER QL: NON REACTIVE
RUBV IGG SERPL IA-ACNC: 4.74 INDEX
URATE SERPL-MCNC: 2.1 MG/DL (ref 2.6–6.2)
WBC # BLD AUTO: 5.9 X10E3/UL (ref 3.4–10.8)

## 2023-05-07 LAB
AMPHETAMINES UR QL SCN: NEGATIVE NG/ML
APPEARANCE UR: CLEAR
BACTERIA #/AREA URNS HPF: NORMAL /[HPF]
BACTERIA UR CULT: NO GROWTH
BACTERIA UR CULT: NORMAL
BARBITURATES UR QL SCN: NEGATIVE NG/ML
BENZODIAZ UR QL SCN: NEGATIVE NG/ML
BILIRUB UR QL STRIP: NEGATIVE
BZE UR QL SCN: NEGATIVE NG/ML
C TRACH RRNA SPEC QL NAA+PROBE: NEGATIVE
CANNABINOIDS UR QL SCN: NEGATIVE NG/ML
CASTS URNS QL MICRO: NORMAL /LPF
COLOR UR: YELLOW
CREAT UR-MCNC: 43.6 MG/DL (ref 20–300)
EPI CELLS #/AREA URNS HPF: NORMAL /HPF (ref 0–10)
GLUCOSE UR QL STRIP: NEGATIVE
HGB UR QL STRIP: NEGATIVE
KETONES UR QL STRIP: NEGATIVE
LABORATORY COMMENT REPORT: NORMAL
LEUKOCYTE ESTERASE UR QL STRIP: NEGATIVE
METHADONE UR QL SCN: NEGATIVE NG/ML
MICRO URNS: NORMAL
MICRO URNS: NORMAL
N GONORRHOEA RRNA SPEC QL NAA+PROBE: NEGATIVE
NITRITE UR QL STRIP: NEGATIVE
OPIATES UR QL SCN: NEGATIVE NG/ML
OXYCODONE+OXYMORPHONE UR QL SCN: NEGATIVE NG/ML
PCP UR QL: NEGATIVE NG/ML
PH UR STRIP: 6.5 [PH] (ref 5–7.5)
PH UR: 6.2 [PH] (ref 4.5–8.9)
PROPOXYPH UR QL SCN: NEGATIVE NG/ML
PROT UR QL STRIP: NEGATIVE
RBC #/AREA URNS HPF: NORMAL /HPF (ref 0–2)
SP GR UR STRIP: 1.01 (ref 1–1.03)
UROBILINOGEN UR STRIP-MCNC: 0.2 MG/DL (ref 0.2–1)
WBC #/AREA URNS HPF: NORMAL /HPF (ref 0–5)

## 2023-05-08 ENCOUNTER — TELEPHONE (OUTPATIENT)
Dept: OBSTETRICS AND GYNECOLOGY | Facility: CLINIC | Age: 23
End: 2023-05-08
Payer: COMMERCIAL

## 2023-05-08 LAB
CFDNA.FET/CFDNA.TOTAL SFR FETUS: NORMAL %
CITATION REF LAB TEST: NORMAL
FET 13+18+21+X+Y ANEUP PLAS.CFDNA: NEGATIVE
FET CHR 21 TS PLAS.CFDNA QL: NEGATIVE
FET SEX PLAS.CFDNA DOSAGE CFDNA: NORMAL
FET TS 13 RISK PLAS.CFDNA QL: NEGATIVE
FET TS 18 RISK WBC.DNA+CFDNA QL: NEGATIVE
GA EST FROM CONCEPTION DATE: NORMAL D
GESTATIONAL AGE > 9:: YES
LAB DIRECTOR NAME PROVIDER: NORMAL
LAB DIRECTOR NAME PROVIDER: NORMAL
LABORATORY COMMENT REPORT: NORMAL
LIMITATIONS OF THE TEST: NORMAL
NEGATIVE PREDICTIVE VALUE: NORMAL
NOTE: NORMAL
PERFORMANCE CHARACTERISTICS: NORMAL
POSITIVE PREDICTIVE VALUE: NORMAL
REF LAB TEST METHOD: NORMAL
TEST PERFORMANCE INFO SPEC: NORMAL

## 2023-05-08 NOTE — TELEPHONE ENCOUNTER
Patient called stating she saw her lab results on MyChart and she just wanted to make sure her labs look good. I have advised the patient after reviewing them that they do look good. Patient was also requesting results for [id number] I have advised those results are still pending. Patient v/u.   Tahmina Whitney  

## 2023-05-10 DIAGNOSIS — O09.899 PREVIOUS PREGNANCY COMPLICATED BY PREGNANCY-INDUCED HYPERTENSION, ANTEPARTUM: Primary | ICD-10-CM

## 2023-05-11 ENCOUNTER — TELEPHONE (OUTPATIENT)
Dept: OBSTETRICS AND GYNECOLOGY | Facility: CLINIC | Age: 23
End: 2023-05-11

## 2023-05-11 LAB
PROT 24H UR-MRATE: 269 MG/24 HR (ref 30–150)
PROT UR-MCNC: 23.4 MG/DL

## 2023-05-11 NOTE — TELEPHONE ENCOUNTER
Caller: Jessica Toussaint    Relationship: Self    Best call back number: [phone]    What is the best time to reach you: AND AND MAY LEAVE V/M    What was the call regarding: PT CALLING FOR GENDER RESULTS AND STATES YOU ALL CAN LEAVE INFO ON HER V/M IF SHE DOES NOT ANSWER.    Do you require a callback: YES

## 2023-05-18 ENCOUNTER — HOSPITAL ENCOUNTER (EMERGENCY)
Age: 23
Discharge: HOME | End: 2023-05-18
Payer: MEDICAID

## 2023-05-18 VITALS
DIASTOLIC BLOOD PRESSURE: 76 MMHG | HEART RATE: 97 BPM | RESPIRATION RATE: 16 BRPM | TEMPERATURE: 98.42 F | OXYGEN SATURATION: 100 % | SYSTOLIC BLOOD PRESSURE: 131 MMHG

## 2023-05-18 VITALS
OXYGEN SATURATION: 98 % | DIASTOLIC BLOOD PRESSURE: 85 MMHG | HEART RATE: 98 BPM | RESPIRATION RATE: 18 BRPM | TEMPERATURE: 98.8 F | SYSTOLIC BLOOD PRESSURE: 135 MMHG

## 2023-05-18 VITALS — BODY MASS INDEX: 20.5 KG/M2

## 2023-05-18 DIAGNOSIS — Z3A.15: ICD-10-CM

## 2023-05-18 DIAGNOSIS — O20.0: Primary | ICD-10-CM

## 2023-05-18 LAB
ALBUMIN LEVEL: 3.9 G/DL (ref 3.5–5)
ALBUMIN/GLOB SERPL: 1.3 {RATIO} (ref 1.1–1.8)
ALP ISO SERPL-ACNC: 53 U/L (ref 38–126)
ALT SERPLBLD-CCNC: 23 U/L (ref 12–78)
ANION GAP SERPL CALC-SCNC: 15.5 MEQ/L (ref 5–15)
AST SERPL QL: 32 U/L (ref 14–36)
BILIRUBIN,TOTAL: 0.2 MG/DL (ref 0.2–1.3)
BUN SERPL-MCNC: 13 MG/DL (ref 7–17)
CALCIUM SPEC-MCNC: 9 MG/DL (ref 8.4–10.2)
CHLORIDE SPEC-SCNC: 98 MMOL/L (ref 98–107)
CO2 SERPL-SCNC: 23 MMOL/L (ref 22–30)
COLOR UR: (no result)
CREAT BLD-SCNC: 0.5 MG/DL (ref 0.52–1.04)
CREATININE CLEARANCE ESTIMATED: 149 ML/MIN (ref 50–200)
ESTIMATED GLOMERULAR FILT RATE: 157 ML/MIN (ref 60–?)
GFR (AFRICAN AMERICAN): 190 ML/MIN (ref 60–?)
GLOBULIN SER CALC-MCNC: 3 G/DL (ref 1.3–3.2)
GLUCOSE: 77 MG/DL (ref 74–100)
HCT VFR BLD CALC: 31.7 % (ref 37–47)
HGB BLD-MCNC: 10.5 G/DL (ref 12.2–16.2)
LEUKOCYTE ESTERASE UR QL STRIP: (no result)
MCHC RBC-ENTMCNC: 33.2 G/DL (ref 31.8–35.4)
MCV RBC: 72.8 FL (ref 81–99)
MEAN CORPUSCULAR HEMOGLOBIN: 24.2 PG (ref 27–31.2)
MICRO URNS: (no result)
PH UR: 6 [PH] (ref 5–8.5)
PLATELET # BLD: 246 K/MM3 (ref 142–424)
POTASSIUM: 3.5 MMOL/L (ref 3.5–5.1)
PROT SERPL-MCNC: 6.9 G/DL (ref 6.3–8.2)
RBC # BLD AUTO: 4.35 M/MM3 (ref 4.2–5.4)
RBC #/AREA URNS HPF: (no result) #/HPF (ref 0–3)
SODIUM SPEC-SCNC: 133 MMOL/L (ref 136–145)
SP GR UR: <= 1.005 (ref 1–1.03)
SQUAMOUS URNS QL MICRO: (no result) #/HPF (ref 0–5)
UROBILINOGEN UR QL: 0.2 EU/DL
WBC # BLD AUTO: 7.2 K/MM3 (ref 4.5–13)
WBC # UR: (no result) #/HPF (ref 0–3)

## 2023-05-18 PROCEDURE — 99284 EMERGENCY DEPT VISIT MOD MDM: CPT

## 2023-05-18 PROCEDURE — 99285 EMERGENCY DEPT VISIT HI MDM: CPT

## 2023-05-18 PROCEDURE — 86901 BLOOD TYPING SEROLOGIC RH(D): CPT

## 2023-05-18 PROCEDURE — 76805 OB US >/= 14 WKS SNGL FETUS: CPT

## 2023-05-18 PROCEDURE — 80053 COMPREHEN METABOLIC PANEL: CPT

## 2023-05-18 PROCEDURE — 87086 URINE CULTURE/COLONY COUNT: CPT

## 2023-05-18 PROCEDURE — 81001 URINALYSIS AUTO W/SCOPE: CPT

## 2023-05-18 PROCEDURE — 86900 BLOOD TYPING SEROLOGIC ABO: CPT

## 2023-05-18 PROCEDURE — 85025 COMPLETE CBC W/AUTO DIFF WBC: CPT

## 2023-05-19 ENCOUNTER — TELEPHONE (OUTPATIENT)
Dept: OBSTETRICS AND GYNECOLOGY | Facility: CLINIC | Age: 23
End: 2023-05-19

## 2023-05-19 ENCOUNTER — ROUTINE PRENATAL (OUTPATIENT)
Dept: OBSTETRICS AND GYNECOLOGY | Facility: CLINIC | Age: 23
End: 2023-05-19
Payer: COMMERCIAL

## 2023-05-19 VITALS — WEIGHT: 121.4 LBS | BODY MASS INDEX: 21.51 KG/M2 | DIASTOLIC BLOOD PRESSURE: 70 MMHG | SYSTOLIC BLOOD PRESSURE: 116 MMHG

## 2023-05-19 DIAGNOSIS — O44.42 LOW-LYING PLACENTA IN SECOND TRIMESTER: ICD-10-CM

## 2023-05-19 DIAGNOSIS — O46.92 VAGINAL BLEEDING IN PREGNANCY, SECOND TRIMESTER: Primary | ICD-10-CM

## 2023-05-19 PROBLEM — O44.52 LOW LYING PLACENTA WITH HEMORRHAGE, SECOND TRIMESTER: Status: ACTIVE | Noted: 2023-05-19

## 2023-05-19 LAB
BILIRUB BLD-MCNC: NEGATIVE MG/DL
CLARITY, POC: CLEAR
COLOR UR: YELLOW
GLUCOSE UR STRIP-MCNC: NEGATIVE MG/DL
KETONES UR QL: NEGATIVE
LEUKOCYTE EST, POC: NEGATIVE
NITRITE UR-MCNC: NEGATIVE MG/ML
PH UR: 8 [PH] (ref 5–8)
PROT UR STRIP-MCNC: NEGATIVE MG/DL
RBC # UR STRIP: NEGATIVE /UL
SP GR UR: 1.01 (ref 1–1.03)
UROBILINOGEN UR QL: NORMAL

## 2023-05-19 NOTE — PROGRESS NOTES
"      OB FOLLOW UP  CC- Here for care of pregnancy        Jessica Toussaint is a 22 y.o.  15w6d patient being seen today for bleeding. It started 1 day(s) ago and has decreased since then. The bleeding was bright red in color. It is currently pink and watery.The patient's blood type is RH Positive. She denies recent intercourse. She has not had a recent cervical check. She does not have associated pain. Pt was seen at [facility] yesterday for vaginal bleeding. MBT: O+. Pt states she felt a gush on her way home from work at 5pm. States she had 3 more gushes before getting to the restroom. Reports it was bright vaginal bleeding, a period-like amount. States she went to [facility]. Labs were WNL. FHT were WNL. US showed \"a separation in uterus and placenta\". Pt states she is still bleeding today but it is pink in color and watery. Denies any pain or fever. Next OB visit with Colleen is . Per Lisa Winn pt should be scheduled with  today. Pt scheduled with Sayda at 3pm. Will request records from [facility].    Her prenatal care is complicated by (and status) :    Patient Active Problem List   Diagnosis   • Pregnancy   • Previous  section   • Previous pregnancy complicated by pregnancy-induced hypertension, antepartum   • Low lying placenta with hemorrhage, second trimester         Ultrasound Today: Yes.  wnl with low lying placenta.  I have personally evaluated the U/S and agree with the findings. Cherelle Swani, KATHY    ROS -   Patient Reports : see above   Patient Denies: Loss of Fluid, Vision Changes, Headaches, Nausea , Vomiting , Contractions and Epigastric pain  Fetal Movement : early   All other systems reviewed and are negative.       The additional following portions of the patient's history were reviewed and updated as appropriate: allergies and current medications.    I have reviewed and agree with the HPI, ROS, and historical information as entered above. Cherelle Jason " Wiliam, APRN    /70   Wt 55.1 kg (121 lb 6.4 oz)   LMP 02/01/2023   BMI 21.51 kg/m²       EXAM:     Prenatal Vitals  BP: 116/70  Weight: 55.1 kg (121 lb 6.4 oz)   Fetal Heart Rate: +                Assessment and Plan    Problem List Items Addressed This Visit    None  Visit Diagnoses     Vaginal bleeding in pregnancy, second trimester    -  Primary    Relevant Orders    US Ob Limited 1 + Fetuses    POC Urinalysis Dipstick (Completed)    Low-lying placenta in second trimester              1. Pregnancy at 15w6d  2. Fetal status reassuring.   3. RTC for worsening symptoms  Pelvic rest, no heavy lifting, straining.  Return if symptoms worsen or fail to improve, for Next scheduled follow up.   VIK +    Cherelle Swain, APRN  05/19/2023

## 2023-05-19 NOTE — TELEPHONE ENCOUNTER
Barron Toussiant  Female, 22 y.o., 2000  MRN:   [id number]  CSN:   [id number]  Phone:   [phone]    PATIENT BARRON TOUSSAINT CALLING IN STATING THAT SHE HAS BEEN TO THE ER AND THAT SHE HAS A SMALL SEPARATION WITH HER PLACENTA.   PLEASE CALL PATIENT BACK AT THE NUMBER LISTED ABOVE.  PATIENT WAS SEEN AT [facility].    THANK YOU

## 2023-05-19 NOTE — TELEPHONE ENCOUNTER
"15w6d. Colleen pt. Pt was seen at [facility] yesterday for vaginal bleeding. MBT: O+. Pt states she felt a gush on her way home from work at 5pm. States she had 3 more gushes before getting to the restroom. Reports it was bright vaginal bleeding, a period-like amount. States she went to [facility]. Labs were WNL. FHT were WNL. US showed \"a separation in uterus and placenta\". Pt states she is still bleeding today but it is pink in color and watery. Denies any pain or fever. Next OB visit with Colleen is 5/31. Per Lisa Winn pt should be scheduled with US today. Pt scheduled with Sayda at 3pm. Will request records from [facility].  "

## 2023-05-31 ENCOUNTER — ROUTINE PRENATAL (OUTPATIENT)
Dept: OBSTETRICS AND GYNECOLOGY | Facility: CLINIC | Age: 23
End: 2023-05-31

## 2023-05-31 VITALS — DIASTOLIC BLOOD PRESSURE: 70 MMHG | BODY MASS INDEX: 21.47 KG/M2 | WEIGHT: 121.2 LBS | SYSTOLIC BLOOD PRESSURE: 108 MMHG

## 2023-05-31 DIAGNOSIS — Z3A.17 17 WEEKS GESTATION OF PREGNANCY: ICD-10-CM

## 2023-05-31 DIAGNOSIS — O44.52 LOW LYING PLACENTA WITH HEMORRHAGE, SECOND TRIMESTER: ICD-10-CM

## 2023-05-31 DIAGNOSIS — O09.899 PREVIOUS PREGNANCY COMPLICATED BY PREGNANCY-INDUCED HYPERTENSION, ANTEPARTUM: ICD-10-CM

## 2023-05-31 DIAGNOSIS — Z98.891 PREVIOUS CESAREAN SECTION: ICD-10-CM

## 2023-05-31 DIAGNOSIS — Z34.82 ENCOUNTER FOR SUPERVISION OF OTHER NORMAL PREGNANCY IN SECOND TRIMESTER: Primary | ICD-10-CM

## 2023-05-31 LAB
GLUCOSE UR STRIP-MCNC: NEGATIVE MG/DL
PROT UR STRIP-MCNC: NEGATIVE MG/DL

## 2023-05-31 NOTE — PROGRESS NOTES
"      OB FOLLOW UP  CC- Here for care of pregnancy        Jessica Toussaint is a 22 y.o.  17w4d patient being seen today for her obstetrical follow up visit. Patient reports since her last visit on 23, she has had occasional episodes of \"darker discharge,\" but denies red vaginal bleeding and cramping. U/s on  showed low-lying posterior placenta.  C/o occasional nausea    Her prenatal care is complicated by (and status) :   Patient Active Problem List   Diagnosis   • Pregnancy   • Previous  section   • Previous pregnancy complicated by pregnancy-induced hypertension, antepartum   • Low lying placenta with hemorrhage, second trimester         Ultrasound Today: No    AFP: desires    ROS -     Patient Denies: Loss of Fluid, Vision Changes, Headaches, Vomiting  and Contractions  Fetal Movement : normal  All other systems reviewed and are negative.       The additional following portions of the patient's history were reviewed and updated as appropriate: allergies, current medications, past family history, past medical history, past social history, past surgical history and problem list.      I have reviewed and agree with the HPI, ROS, and historical information as entered above. Linda Macias MD          EXAM:     Prenatal Vitals  BP: 108/70  Weight: 55 kg (121 lb 3.2 oz)   Fetal Heart Rate: +   Pelvic Exam:        Urine Glucose Read-only: Negative  Urine Protein Read-only: Negative           Assessment and Plan    Problem List Items Addressed This Visit        Gynecologic and Obstetric Problems    Low lying placenta with hemorrhage, second trimester       Other    Pregnancy    Overview     cfDNA low risk         Previous  section    Overview     For FTP  Likely desires repeat c/s         Previous pregnancy complicated by pregnancy-induced hypertension, antepartum    Overview     Preeclampsia 37 wks, induced  Baseline PEP, 24h urine  Baby asa        Other Visit Diagnoses     Encounter for " supervision of other normal pregnancy in second trimester    -  Primary    Relevant Orders    POC Urinalysis Dipstick (Completed)    Alpha Fetoprotein, Maternal          1. Pregnancy at 17w4d  2. Fetal status reassuring.   3. Counseled on MSAFP alone in relation to OTD and placental issues.  - desires today  4. Anatomy scan next visit.   5. Activity and Exercise discussed.  6. Patient is on Prenatal vitamins  Return in about 3 weeks (around 6/21/2023) for F/U Prenatal, U/S Next Visit.    Linda Macias MD  05/31/2023

## 2023-06-02 LAB
AFP INTERP SERPL-IMP: NORMAL
AFP INTERP SERPL-IMP: NORMAL
AFP MOM SERPL: 0.81
AFP SERPL-MCNC: 40 NG/ML
AGE AT DELIVERY: 21.3 YR
GA METHOD: NORMAL
GA: 17.6 WEEKS
IDDM PATIENT QL: NO
LABORATORY COMMENT REPORT: NORMAL
MULTIPLE PREGNANCY: NO
NEURAL TUBE DEFECT RISK FETUS: NORMAL %
RESULT: NORMAL

## 2023-06-21 PROBLEM — O44.52 LOW LYING PLACENTA WITH HEMORRHAGE, SECOND TRIMESTER: Status: RESOLVED | Noted: 2023-05-19 | Resolved: 2023-06-21

## 2023-08-16 ENCOUNTER — ROUTINE PRENATAL (OUTPATIENT)
Dept: OBSTETRICS AND GYNECOLOGY | Facility: CLINIC | Age: 23
End: 2023-08-16
Payer: COMMERCIAL

## 2023-08-16 VITALS — DIASTOLIC BLOOD PRESSURE: 60 MMHG | WEIGHT: 131 LBS | BODY MASS INDEX: 23.21 KG/M2 | SYSTOLIC BLOOD PRESSURE: 124 MMHG

## 2023-08-16 DIAGNOSIS — Z34.93 PRENATAL CARE IN THIRD TRIMESTER: Primary | ICD-10-CM

## 2023-08-16 DIAGNOSIS — Z13.1 SCREENING FOR DIABETES MELLITUS: ICD-10-CM

## 2023-08-16 DIAGNOSIS — O09.899 PREVIOUS PREGNANCY COMPLICATED BY PREGNANCY-INDUCED HYPERTENSION, ANTEPARTUM: ICD-10-CM

## 2023-08-16 DIAGNOSIS — Z23 NEED FOR VACCINE FOR DT (DIPHTHERIA-TETANUS): ICD-10-CM

## 2023-08-16 DIAGNOSIS — Z98.891 PREVIOUS CESAREAN SECTION: ICD-10-CM

## 2023-08-16 LAB
EXPIRATION DATE: 0
GLUCOSE UR STRIP-MCNC: NEGATIVE MG/DL
Lab: 0
PROT UR STRIP-MCNC: NEGATIVE MG/DL

## 2023-08-16 RX ORDER — ASPIRIN 81 MG/1
81 TABLET ORAL DAILY
COMMUNITY

## 2023-08-16 NOTE — PROGRESS NOTES
OB FOLLOW UP  CC- Here for care of pregnancy        Jessica Toussaint is a 23 y.o.  28w4d patient being seen today for her obstetrical follow up. Patient reports minor congestion and cough.     Patient undergoing Glucola testing today. She is due for her testing at 12:23.       MBT: O+  Rhogam:  Not Indicated   28 week packet: reviewed with patient   TDAP: given today  Flu Status: Declines  Ultrasound Today: No    Her prenatal care is complicated by (and status) :    Patient Active Problem List   Diagnosis    Pregnancy    Previous  section    Previous pregnancy complicated by pregnancy-induced hypertension, antepartum         ROS -   Patient Reports :  minor congestion and cough that is unusual for the patient  Patient Denies: Loss of Fluid, Vaginal Spotting, Vision Changes, and Headaches  Fetal Movement : normal    The additional following portions of the patient's history were reviewed and updated as appropriate: allergies, current medications, past family history, past medical history, past social history, past surgical history, and problem list.    I have reviewed and agree with the HPI, ROS, and historical information as entered above. Cherelle Swain, APRN      /60   Wt 59.4 kg (131 lb)   LMP 2023   BMI 23.21 kg/mý         EXAM:     Prenatal Vitals  BP: 124/60  Weight: 59.4 kg (131 lb)   Fetal Heart Rate: +      Fundal Height (cm): 28 cm        Urine Glucose Read-only: Negative  Urine Protein Read-only: Negative         Assessment and Plan    Problem List Items Addressed This Visit          Gravid and     Previous  section    Overview     For FTP  Likely desires repeat c/s  Desires salpingectomy- needs to sign KMA         Previous pregnancy complicated by pregnancy-induced hypertension, antepartum    Overview     Preeclampsia 37 wks, induced  Baseline PEP, 24h urine 269mg  Baby asa          Other Visit Diagnoses       Prenatal care in third trimester    -   Primary    Relevant Orders    POC Protein, Urine, Qualitative, Dipstick (Completed)    POC Glucose, Urine, Qualitative, Dipstick (Completed)    CBC (No Diff)    Antibody Screen    Screening for diabetes mellitus        Relevant Orders    Gestational Screen 1 Hr ([facility])    Need for vaccine for DT (diphtheria-tetanus)        Relevant Orders    Tdap Vaccine Greater Than or Equal To 6yo IM (Completed)            Pregnancy at 28w4d  1 hr Glucola, CBC, and antibody screen today  and TDAP given today  Fetal movement/PTL or Labor precautions  Patient is on Prenatal vitamins  Reviewed Pre-eclampsia signs/symptoms  Activity and Exercise discussed.  Return in about 4 weeks (around 9/13/2023).    Cherelle Swain, APRN  08/16/2023

## 2023-08-17 ENCOUNTER — TELEPHONE (OUTPATIENT)
Dept: OBSTETRICS AND GYNECOLOGY | Facility: CLINIC | Age: 23
End: 2023-08-17

## 2023-08-17 DIAGNOSIS — O99.019 IRON DEFICIENCY ANEMIA DURING PREGNANCY: Primary | ICD-10-CM

## 2023-08-17 DIAGNOSIS — D50.9 IRON DEFICIENCY ANEMIA DURING PREGNANCY: Primary | ICD-10-CM

## 2023-08-17 LAB
BLD GP AB SCN SERPL QL: NEGATIVE
ERYTHROCYTE [DISTWIDTH] IN BLOOD BY AUTOMATED COUNT: 13.4 % (ref 12.3–15.4)
GLUCOSE 1H P 50 G GLC PO SERPL-MCNC: 121 MG/DL (ref 65–139)
HCT VFR BLD AUTO: 29 % (ref 34–46.6)
HGB BLD-MCNC: 9.1 G/DL (ref 12–15.9)
MCH RBC QN AUTO: 23 PG (ref 26.6–33)
MCHC RBC AUTO-ENTMCNC: 31.4 G/DL (ref 31.5–35.7)
MCV RBC AUTO: 73.4 FL (ref 79–97)
PLATELET # BLD AUTO: 213 10*3/MM3 (ref 140–450)
RBC # BLD AUTO: 3.95 10*6/MM3 (ref 3.77–5.28)
WBC # BLD AUTO: 5.1 10*3/MM3 (ref 3.4–10.8)

## 2023-08-17 RX ORDER — FERROUS SULFATE 325(65) MG
325 TABLET ORAL
Qty: 90 TABLET | Refills: 1 | Status: SHIPPED | OUTPATIENT
Start: 2023-08-17

## 2023-09-13 ENCOUNTER — ROUTINE PRENATAL (OUTPATIENT)
Dept: OBSTETRICS AND GYNECOLOGY | Facility: CLINIC | Age: 23
End: 2023-09-13
Payer: COMMERCIAL

## 2023-09-13 VITALS — DIASTOLIC BLOOD PRESSURE: 64 MMHG | BODY MASS INDEX: 23.63 KG/M2 | SYSTOLIC BLOOD PRESSURE: 120 MMHG | WEIGHT: 133.4 LBS

## 2023-09-13 DIAGNOSIS — Z3A.32 32 WEEKS GESTATION OF PREGNANCY: Primary | ICD-10-CM

## 2023-09-13 DIAGNOSIS — Z98.891 PREVIOUS CESAREAN SECTION: ICD-10-CM

## 2023-09-13 DIAGNOSIS — O09.899 PREVIOUS PREGNANCY COMPLICATED BY PREGNANCY-INDUCED HYPERTENSION, ANTEPARTUM: ICD-10-CM

## 2023-09-13 NOTE — PROGRESS NOTES
"      OB FOLLOW UP  CC- Here for care of pregnancy        Jessica Toussaint is a 23 y.o.  32w4d patient being seen today for her obstetrical follow up visit. Patient reports \"pubic bone pain\". GTT wnl    Her prenatal care is complicated by (and status) :    Patient Active Problem List   Diagnosis    Pregnancy    Previous  section    Previous pregnancy complicated by pregnancy-induced hypertension, antepartum       Flu Status: Desires at future appt  TDAP status: received at last visit  Rhogam status: was not indicated  28 week labs: Reviewed and patient is taking an iron supplement.   Ultrasound Today: No  Non Stress Test: No.      ROS -     Patient Denies: Loss of Fluid, Vaginal Spotting, Vision Changes, Headaches, Nausea , Vomiting , Contractions, and Epigastric pain  Fetal Movement : normal  All other systems reviewed and are negative.       The additional following portions of the patient's history were reviewed and updated as appropriate: allergies, current medications, past family history, past medical history, past social history, past surgical history, and problem list.    I have reviewed and agree with the HPI, ROS, and historical information as entered above. Linda Macias MD      /64   Wt 60.5 kg (133 lb 6.4 oz)   LMP 2023   BMI 23.63 kg/m²         EXAM:     Prenatal Vitals  BP: 120/64  Weight: 60.5 kg (133 lb 6.4 oz)                               Assessment and Plan    Problem List Items Addressed This Visit       Pregnancy - Primary    Overview     cfDNA low risk, AFP neg         Previous  section    Overview     For FTP  desires repeat c/s           Relevant Orders    US Ob Follow Up Transabdominal Approach    Previous pregnancy complicated by pregnancy-induced hypertension, antepartum    Overview     Preeclampsia 37 wks, induced  Baseline PEP, 24h urine 269mg  Baby asa         Relevant Orders    US Ob Follow Up Transabdominal Approach       Pregnancy at 32w4d  Iron " for anemia  US next time  Sched repeat c/s  Fetal status reassuring.  28 week labs reviewed.    Activity and Exercise discussed.  Fetal movement/PTL or Labor precautions  Return in about 2 weeks (around 9/27/2023) for F/U Prenatal, U/S Next Visit.    Linda Macias MD  09/13/2023

## 2023-09-27 ENCOUNTER — ROUTINE PRENATAL (OUTPATIENT)
Dept: OBSTETRICS AND GYNECOLOGY | Facility: CLINIC | Age: 23
End: 2023-09-27
Payer: COMMERCIAL

## 2023-09-27 VITALS — DIASTOLIC BLOOD PRESSURE: 82 MMHG | BODY MASS INDEX: 24.09 KG/M2 | SYSTOLIC BLOOD PRESSURE: 118 MMHG | WEIGHT: 136 LBS

## 2023-09-27 DIAGNOSIS — O09.899 PREVIOUS PREGNANCY COMPLICATED BY PREGNANCY-INDUCED HYPERTENSION, ANTEPARTUM: ICD-10-CM

## 2023-09-27 DIAGNOSIS — Z34.93 PRENATAL CARE IN THIRD TRIMESTER: Primary | ICD-10-CM

## 2023-09-27 DIAGNOSIS — Z98.891 PREVIOUS CESAREAN SECTION: ICD-10-CM

## 2023-09-27 DIAGNOSIS — Z3A.34 34 WEEKS GESTATION OF PREGNANCY: ICD-10-CM

## 2023-09-27 LAB
EXPIRATION DATE: 0
GLUCOSE UR STRIP-MCNC: NEGATIVE MG/DL
Lab: 0
PROT UR STRIP-MCNC: NEGATIVE MG/DL

## 2023-09-27 NOTE — PROGRESS NOTES
OB FOLLOW UP  CC- Here for care of pregnancy        Jessica Toussaint is a 23 y.o.  34w4d patient being seen today for her obstetrical follow up visit. Patient reports no complaints..     Her prenatal care is complicated by (and status) : Previous  section  Patient Active Problem List   Diagnosis    Pregnancy    Previous  section    Previous pregnancy complicated by pregnancy-induced hypertension, antepartum       Flu Status: Declines  Ultrasound Today: Yes  Non Stress Test: No.    ROS -   Patient Reports : No Problems  Patient Denies: Loss of Fluid, Vaginal Spotting, Vision Changes, and Headaches  Fetal Movement : normal  All other systems reviewed and are negative.       The additional following portions of the patient's history were reviewed and updated as appropriate: allergies, current medications, past family history, past medical history, past social history, past surgical history, and problem list.    I have reviewed and agree with the HPI, ROS, and historical information as entered above. Linda Macias MD      /82   Wt 61.7 kg (136 lb)   LMP 2023   BMI 24.09 kg/m²       EXAM:     Prenatal Vitals  BP: 118/82  Weight: 61.7 kg (136 lb)   Fetal Heart Rate: +               Urine Glucose Read-only: Negative  Urine Protein Read-only: Negative           Assessment and Plan    Problem List Items Addressed This Visit       Pregnancy    Overview     cfDNA low risk, AFP neg  EFW 34 wks 39%ile.          Previous  section    Overview     For FTP  desires repeat c/s           Previous pregnancy complicated by pregnancy-induced hypertension, antepartum    Overview     Preeclampsia 37 wks, induced  Baseline PEP, 24h urine 269mg  Baby asa          Other Visit Diagnoses       Prenatal care in third trimester    -  Primary    Relevant Orders    POC Protein, Urine, Qualitative, Dipstick (Completed)    POC Glucose, Urine, Qualitative, Dipstick (Completed)            Pregnancy at  34w4d. US today normal growth and fluid.   Fetal status reassuring.   Activity and Exercise discussed.  Fetal movement/PTL or Labor precautions  GBS next visit  Return in about 2 weeks (around 10/11/2023) for F/U Prenatal.    Linda Macias MD  09/27/2023

## 2023-10-10 NOTE — PROGRESS NOTES
OB FOLLOW UP  CC- Here for care of pregnancy        Jessica Toussaint is a 23 y.o.  36w3d patient being seen today for her obstetrical follow up visit. Patient reports lossing her mucus plug a couple of days ago.     Her prenatal care is complicated by (and status) : None  Patient Active Problem List   Diagnosis    Pregnancy    Previous  section    Previous pregnancy complicated by pregnancy-induced hypertension, antepartum       GBS Status: Done Today. She is not allergic to PCN.    No Known Allergies       Flu Status: Will give in office today  Her Delivery Plan is: Repeat . Scheduled    US today: no  Non Stress Test: No.    ROS -   Patient Reports : No Problems  Patient Denies: Loss of Fluid, Vaginal Spotting, Vision Changes, Headaches, Nausea , Vomiting , and Contractions  Fetal Movement : normal  All other systems reviewed and are negative.       The additional following portions of the patient's history were reviewed and updated as appropriate: allergies and current medications.    I have reviewed and agree with the HPI, ROS, and historical information as entered above. Linda Macias MD        EXAM:     Prenatal Vitals  BP: 116/76  Weight: 63.5 kg (140 lb)   Fetal Heart Rate: +              Urine Glucose Read-only: Negative  Urine Protein Read-only: (!) 3+           Assessment and Plan    Problem List Items Addressed This Visit       Pregnancy - Primary    Overview     cfDNA low risk, AFP neg  EFW 34 wks 39%ile.          Relevant Orders    POC Glucose, Urine, Qualitative, Dipstick (Completed)    POC Protein, Urine, Qualitative, Dipstick (Completed)    Previous  section    Overview     For FTP  desires repeat c/s           Previous pregnancy complicated by pregnancy-induced hypertension, antepartum    Overview     Preeclampsia 37 wks, induced  Baseline PEP, 24h urine 269mg  Baby asa          Other Visit Diagnoses       Encounter for  screening for Streptococcus B         Relevant Orders    Group B Streptococcus Culture - Swab, Vaginal/Rectum    Immunization due        Relevant Orders    Fluzone (or Fluarix & Flulaval for VFC) >6 Mos (0320-6676) (Completed)            Pregnancy at 36w3d. C/s sched.   Fetal status reassuring.   Reviewed Pre-eclampsia signs/symptoms  Delivery options reviewed with patient  Signs of labor reviewed  Kick counts reviewed  Activity and Exercise discussed.  Return in about 1 week (around 10/18/2023) for F/U Prenatal.    Linda Macias MD  10/11/2023

## 2023-10-11 ENCOUNTER — ROUTINE PRENATAL (OUTPATIENT)
Dept: OBSTETRICS AND GYNECOLOGY | Facility: CLINIC | Age: 23
End: 2023-10-11
Payer: COMMERCIAL

## 2023-10-11 VITALS — WEIGHT: 140 LBS | DIASTOLIC BLOOD PRESSURE: 76 MMHG | SYSTOLIC BLOOD PRESSURE: 116 MMHG | BODY MASS INDEX: 24.8 KG/M2

## 2023-10-11 DIAGNOSIS — Z98.891 PREVIOUS CESAREAN SECTION: ICD-10-CM

## 2023-10-11 DIAGNOSIS — Z3A.36 36 WEEKS GESTATION OF PREGNANCY: Primary | ICD-10-CM

## 2023-10-11 DIAGNOSIS — O09.899 PREVIOUS PREGNANCY COMPLICATED BY PREGNANCY-INDUCED HYPERTENSION, ANTEPARTUM: ICD-10-CM

## 2023-10-11 DIAGNOSIS — Z23 IMMUNIZATION DUE: ICD-10-CM

## 2023-10-11 DIAGNOSIS — Z36.85 ENCOUNTER FOR ANTENATAL SCREENING FOR STREPTOCOCCUS B: ICD-10-CM

## 2023-10-11 LAB
EXPIRATION DATE: 1
GLUCOSE UR STRIP-MCNC: NEGATIVE MG/DL
Lab: 1
PROT UR STRIP-MCNC: ABNORMAL MG/DL

## 2023-10-14 ENCOUNTER — HOSPITAL ENCOUNTER (OUTPATIENT)
Dept: HOSPITAL 22 - OBOUT | Age: 23
Discharge: HOME | End: 2023-10-14
Payer: MEDICAID

## 2023-10-14 VITALS — BODY MASS INDEX: 24.4 KG/M2

## 2023-10-14 DIAGNOSIS — Z3A.37: ICD-10-CM

## 2023-10-14 DIAGNOSIS — O26.893: Primary | ICD-10-CM

## 2023-10-14 LAB
COLOR UR: YELLOW
LEUKOCYTE ESTERASE UR QL STRIP: (no result)
MICRO URNS: (no result)
PH UR: 7 [PH] (ref 5–8.5)
PROT UR STRIP-MCNC: (no result) MG/DL
SP GR UR: 1.02 (ref 1–1.03)
UROBILINOGEN UR QL: 0.2 EU/DL

## 2023-10-14 PROCEDURE — 81001 URINALYSIS AUTO W/SCOPE: CPT

## 2023-10-14 PROCEDURE — 84112 EVAL AMNIOTIC FLUID PROTEIN: CPT

## 2023-10-14 PROCEDURE — 80305 DRUG TEST PRSMV DIR OPT OBS: CPT

## 2023-10-14 PROCEDURE — 59025 FETAL NON-STRESS TEST: CPT

## 2023-10-14 PROCEDURE — G0463 HOSPITAL OUTPT CLINIC VISIT: HCPCS

## 2023-10-15 LAB — B-HEM STREP SPEC QL CULT: POSITIVE

## 2023-10-18 ENCOUNTER — ROUTINE PRENATAL (OUTPATIENT)
Dept: OBSTETRICS AND GYNECOLOGY | Facility: CLINIC | Age: 23
End: 2023-10-18
Payer: COMMERCIAL

## 2023-10-18 VITALS — BODY MASS INDEX: 25.15 KG/M2 | SYSTOLIC BLOOD PRESSURE: 124 MMHG | WEIGHT: 142 LBS | DIASTOLIC BLOOD PRESSURE: 80 MMHG

## 2023-10-18 DIAGNOSIS — Z34.93 PRENATAL CARE IN THIRD TRIMESTER: Primary | ICD-10-CM

## 2023-10-18 DIAGNOSIS — O09.899 PREVIOUS PREGNANCY COMPLICATED BY PREGNANCY-INDUCED HYPERTENSION, ANTEPARTUM: ICD-10-CM

## 2023-10-18 DIAGNOSIS — Z98.891 PREVIOUS CESAREAN SECTION: ICD-10-CM

## 2023-10-18 DIAGNOSIS — N89.8 VAGINAL DISCHARGE: ICD-10-CM

## 2023-10-18 NOTE — PROGRESS NOTES
OB FOLLOW UP  CC- Here for care of pregnancy        Jessica Toussaint is a 23 y.o.  37w4d patient being seen today for her obstetrical follow up visit. Patient reports increased thin/watery discharge.  She reports that this began on Saturday, and was seen at [facility]. It tested negative for amniotic fluid.  She also reports having a headache -Monday that was not alleviated with tylenol.  Irregular contractions.     She denies vaginal spotting, vision changes, swelling.  She reports adequate fetal movements, >10 movements in 10 hours.      Her prenatal care is complicated by (and status) :   Patient Active Problem List   Diagnosis    Pregnancy    Previous  section    Previous pregnancy complicated by pregnancy-induced hypertension, antepartum       GBS Status: positive   Strep Gp B Culture   Date Value Ref Range Status   10/11/2023 Positive (A) Negative Final     Comment:     Centers for Disease Control and Prevention (CDC) and American Congress  of Obstetricians and Gynecologists (ACOG) guidelines for prevention of   group B streptococcal (GBS) disease specify co-collection of  a vaginal and rectal swab specimen to maximize sensitivity of GBS  detection. Per the CDC and ACOG, swabbing both the lower vagina and  rectum substantially increases the yield of detection compared with  sampling the vagina alone.  Penicillin G, ampicillin, or cefazolin are indicated for intrapartum  prophylaxis of  GBS colonization. Reflex susceptibility  testing should be performed prior to use of clindamycin only on GBS  isolates from penicillin-allergic women who are considered a high risk  for anaphylaxis. Treatment with vancomycin without additional testing  is warranted if resistance to clindamycin is noted.           No Known Allergies       Flu Status: Already given in current flu season  Her Delivery Plan is: Repeat . Scheduled- 11/3/23     today: no  Non Stress  Test: No.    ROS -   Patient Reports :  discharge, headaches and contractions.   Patient Denies: Vaginal Spotting, Vision Changes, Nausea , Vomiting , and Epigastric pain  Fetal Movement : normal  All other systems reviewed and are negative.       The additional following portions of the patient's history were reviewed and updated as appropriate: allergies, current medications, past family history, past medical history, past social history, past surgical history, and problem list.    I have reviewed and agree with the HPI, ROS, and historical information as entered above. Cherelle Swain, APRN        EXAM:     Prenatal Vitals  BP: 124/80  Weight: 64.4 kg (142 lb)   Fetal Heart Rate: +       Dilation/Effacement/Station  Dilation: 1  Effacement (%): 50  Station: -2  Negative pooling, nitrazine, Valsalva      Urine Glucose Read-only: Negative  Urine Protein Read-only: Negative           Assessment and Plan    Problem List Items Addressed This Visit          Gravid and     Previous  section    Overview     For FTP  desires repeat c/s           Previous pregnancy complicated by pregnancy-induced hypertension, antepartum    Overview     Preeclampsia 37 wks, induced  Baseline PEP, 24h urine 269mg  Baby asa          Other Visit Diagnoses       Prenatal care in third trimester    -  Primary    Relevant Orders    POC Glucose, Urine, Qualitative, Dipstick (Completed)    POC Protein, Urine, Qualitative, Dipstick (Completed)    Vaginal discharge                Pregnancy at 37w4d  Fetal status reassuring.   Reviewed Pre-eclampsia signs/symptoms  Delivery options reviewed with patient  Signs of labor reviewed  Kick counts reviewed  Activity and Exercise discussed.  Return in about 1 week (around 10/25/2023).  She stopped bASA.  Neg SROM now.     Cherelle Swain, APRN  10/18/2023

## 2023-10-19 ENCOUNTER — HOSPITAL ENCOUNTER (INPATIENT)
Dept: HOSPITAL 22 - OBOUT | Age: 23
LOS: 4 days | Discharge: HOME | End: 2023-10-23
Payer: MEDICAID

## 2023-10-19 VITALS — HEART RATE: 103 BPM | SYSTOLIC BLOOD PRESSURE: 140 MMHG | DIASTOLIC BLOOD PRESSURE: 80 MMHG | OXYGEN SATURATION: 97 %

## 2023-10-19 VITALS — SYSTOLIC BLOOD PRESSURE: 172 MMHG | DIASTOLIC BLOOD PRESSURE: 112 MMHG

## 2023-10-19 VITALS — HEART RATE: 91 BPM | SYSTOLIC BLOOD PRESSURE: 159 MMHG | DIASTOLIC BLOOD PRESSURE: 111 MMHG

## 2023-10-19 VITALS — BODY MASS INDEX: 25.1 KG/M2

## 2023-10-19 VITALS — HEART RATE: 97 BPM | SYSTOLIC BLOOD PRESSURE: 130 MMHG | DIASTOLIC BLOOD PRESSURE: 91 MMHG | OXYGEN SATURATION: 98 %

## 2023-10-19 VITALS — OXYGEN SATURATION: 99 % | DIASTOLIC BLOOD PRESSURE: 98 MMHG | HEART RATE: 98 BPM | SYSTOLIC BLOOD PRESSURE: 132 MMHG

## 2023-10-19 VITALS — DIASTOLIC BLOOD PRESSURE: 96 MMHG | OXYGEN SATURATION: 97 % | HEART RATE: 100 BPM | SYSTOLIC BLOOD PRESSURE: 149 MMHG

## 2023-10-19 VITALS
OXYGEN SATURATION: 98 % | DIASTOLIC BLOOD PRESSURE: 105 MMHG | TEMPERATURE: 98.06 F | SYSTOLIC BLOOD PRESSURE: 159 MMHG | HEART RATE: 94 BPM | RESPIRATION RATE: 18 BRPM

## 2023-10-19 VITALS — SYSTOLIC BLOOD PRESSURE: 158 MMHG | DIASTOLIC BLOOD PRESSURE: 111 MMHG

## 2023-10-19 VITALS — SYSTOLIC BLOOD PRESSURE: 172 MMHG | DIASTOLIC BLOOD PRESSURE: 109 MMHG | HEART RATE: 90 BPM

## 2023-10-19 VITALS — DIASTOLIC BLOOD PRESSURE: 93 MMHG | HEART RATE: 100 BPM | OXYGEN SATURATION: 97 % | SYSTOLIC BLOOD PRESSURE: 143 MMHG

## 2023-10-19 VITALS — HEART RATE: 110 BPM | DIASTOLIC BLOOD PRESSURE: 111 MMHG | SYSTOLIC BLOOD PRESSURE: 158 MMHG

## 2023-10-19 DIAGNOSIS — D62: ICD-10-CM

## 2023-10-19 DIAGNOSIS — N85.8: ICD-10-CM

## 2023-10-19 DIAGNOSIS — O34.211: Primary | ICD-10-CM

## 2023-10-19 DIAGNOSIS — Z3A.37: ICD-10-CM

## 2023-10-19 LAB
COLOR UR: YELLOW
HCT VFR BLD CALC: 35.2 % (ref 37–47)
HGB BLD-MCNC: 11.3 G/DL (ref 12.2–16.2)
MCHC RBC-ENTMCNC: 32.2 G/DL (ref 31.8–35.4)
MCV RBC: 69.4 FL (ref 81–99)
MEAN CORPUSCULAR HEMOGLOBIN: 22.3 PG (ref 27–31.2)
MICRO URNS: (no result)
PH UR: 6.5 [PH] (ref 5–8.5)
PLATELET # BLD: 157 K/MM3 (ref 142–424)
PROT UR STRIP-MCNC: (no result) MG/DL
RBC # BLD AUTO: 5.07 M/MM3 (ref 4.2–5.4)
SP GR UR: 1.01 (ref 1–1.03)
SQUAMOUS URNS QL MICRO: (no result) #/HPF (ref 0–5)
UROBILINOGEN UR QL: 0.2 EU/DL
WBC # BLD AUTO: 8.8 K/MM3 (ref 4.8–10.8)

## 2023-10-19 PROCEDURE — 84450 TRANSFERASE (AST) (SGOT): CPT

## 2023-10-19 PROCEDURE — 83735 ASSAY OF MAGNESIUM: CPT

## 2023-10-19 PROCEDURE — 59514 CESAREAN DELIVERY ONLY: CPT

## 2023-10-19 PROCEDURE — 84112 EVAL AMNIOTIC FLUID PROTEIN: CPT

## 2023-10-19 PROCEDURE — G0283 ELEC STIM OTHER THAN WOUND: HCPCS

## 2023-10-19 PROCEDURE — 85025 COMPLETE CBC W/AUTO DIFF WBC: CPT

## 2023-10-19 PROCEDURE — 94761 N-INVAS EAR/PLS OXIMETRY MLT: CPT

## 2023-10-19 PROCEDURE — 84460 ALANINE AMINO (ALT) (SGPT): CPT

## 2023-10-19 PROCEDURE — 59025 FETAL NON-STRESS TEST: CPT

## 2023-10-19 PROCEDURE — 49002 REOPENING OF ABDOMEN: CPT

## 2023-10-19 PROCEDURE — P9016 RBC LEUKOCYTES REDUCED: HCPCS

## 2023-10-19 PROCEDURE — 81001 URINALYSIS AUTO W/SCOPE: CPT

## 2023-10-19 PROCEDURE — 80048 BASIC METABOLIC PNL TOTAL CA: CPT

## 2023-10-19 PROCEDURE — 96374 THER/PROPH/DIAG INJ IV PUSH: CPT

## 2023-10-19 PROCEDURE — 80053 COMPREHEN METABOLIC PANEL: CPT

## 2023-10-19 PROCEDURE — 84550 ASSAY OF BLOOD/URIC ACID: CPT

## 2023-10-19 PROCEDURE — 88307 TISSUE EXAM BY PATHOLOGIST: CPT

## 2023-10-19 PROCEDURE — 36415 COLL VENOUS BLD VENIPUNCTURE: CPT

## 2023-10-19 PROCEDURE — 76705 ECHO EXAM OF ABDOMEN: CPT

## 2023-10-19 PROCEDURE — 86850 RBC ANTIBODY SCREEN: CPT

## 2023-10-19 PROCEDURE — 85378 FIBRIN DEGRADE SEMIQUANT: CPT

## 2023-10-19 PROCEDURE — 85730 THROMBOPLASTIN TIME PARTIAL: CPT

## 2023-10-19 PROCEDURE — 85610 PROTHROMBIN TIME: CPT

## 2023-10-19 PROCEDURE — P9017 PLASMA 1 DONOR FRZ W/IN 8 HR: HCPCS

## 2023-10-19 PROCEDURE — 76856 US EXAM PELVIC COMPLETE: CPT

## 2023-10-19 PROCEDURE — 85384 FIBRINOGEN ACTIVITY: CPT

## 2023-10-19 PROCEDURE — 85007 BL SMEAR W/DIFF WBC COUNT: CPT

## 2023-10-19 PROCEDURE — 80305 DRUG TEST PRSMV DIR OPT OBS: CPT

## 2023-10-20 VITALS
OXYGEN SATURATION: 100 % | SYSTOLIC BLOOD PRESSURE: 143 MMHG | HEART RATE: 88 BPM | RESPIRATION RATE: 18 BRPM | DIASTOLIC BLOOD PRESSURE: 90 MMHG

## 2023-10-20 VITALS
SYSTOLIC BLOOD PRESSURE: 150 MMHG | OXYGEN SATURATION: 100 % | HEART RATE: 86 BPM | DIASTOLIC BLOOD PRESSURE: 93 MMHG | RESPIRATION RATE: 17 BRPM

## 2023-10-20 VITALS
SYSTOLIC BLOOD PRESSURE: 154 MMHG | OXYGEN SATURATION: 100 % | RESPIRATION RATE: 18 BRPM | DIASTOLIC BLOOD PRESSURE: 86 MMHG | HEART RATE: 89 BPM

## 2023-10-20 VITALS — SYSTOLIC BLOOD PRESSURE: 154 MMHG | OXYGEN SATURATION: 100 % | DIASTOLIC BLOOD PRESSURE: 96 MMHG | HEART RATE: 93 BPM

## 2023-10-20 VITALS — HEART RATE: 104 BPM | DIASTOLIC BLOOD PRESSURE: 78 MMHG | SYSTOLIC BLOOD PRESSURE: 127 MMHG

## 2023-10-20 VITALS
HEART RATE: 69 BPM | OXYGEN SATURATION: 99 % | RESPIRATION RATE: 16 BRPM | DIASTOLIC BLOOD PRESSURE: 76 MMHG | SYSTOLIC BLOOD PRESSURE: 124 MMHG | TEMPERATURE: 97.3 F

## 2023-10-20 VITALS
DIASTOLIC BLOOD PRESSURE: 66 MMHG | HEART RATE: 81 BPM | RESPIRATION RATE: 17 BRPM | SYSTOLIC BLOOD PRESSURE: 118 MMHG | OXYGEN SATURATION: 98 %

## 2023-10-20 VITALS
RESPIRATION RATE: 18 BRPM | TEMPERATURE: 97.34 F | OXYGEN SATURATION: 99 % | SYSTOLIC BLOOD PRESSURE: 122 MMHG | DIASTOLIC BLOOD PRESSURE: 73 MMHG | HEART RATE: 74 BPM

## 2023-10-20 VITALS — OXYGEN SATURATION: 97 % | SYSTOLIC BLOOD PRESSURE: 144 MMHG | DIASTOLIC BLOOD PRESSURE: 102 MMHG | HEART RATE: 97 BPM

## 2023-10-20 VITALS — OXYGEN SATURATION: 100 % | DIASTOLIC BLOOD PRESSURE: 86 MMHG | SYSTOLIC BLOOD PRESSURE: 140 MMHG | HEART RATE: 89 BPM

## 2023-10-20 VITALS
SYSTOLIC BLOOD PRESSURE: 133 MMHG | OXYGEN SATURATION: 99 % | HEART RATE: 73 BPM | TEMPERATURE: 97.5 F | RESPIRATION RATE: 18 BRPM | DIASTOLIC BLOOD PRESSURE: 94 MMHG

## 2023-10-20 VITALS
SYSTOLIC BLOOD PRESSURE: 124 MMHG | HEART RATE: 82 BPM | DIASTOLIC BLOOD PRESSURE: 76 MMHG | OXYGEN SATURATION: 98 % | RESPIRATION RATE: 17 BRPM

## 2023-10-20 VITALS
RESPIRATION RATE: 18 BRPM | OXYGEN SATURATION: 99 % | HEART RATE: 101 BPM | SYSTOLIC BLOOD PRESSURE: 144 MMHG | TEMPERATURE: 98 F | DIASTOLIC BLOOD PRESSURE: 92 MMHG

## 2023-10-20 VITALS — DIASTOLIC BLOOD PRESSURE: 81 MMHG | HEART RATE: 108 BPM | SYSTOLIC BLOOD PRESSURE: 131 MMHG

## 2023-10-20 VITALS
TEMPERATURE: 97.52 F | DIASTOLIC BLOOD PRESSURE: 71 MMHG | RESPIRATION RATE: 12 BRPM | OXYGEN SATURATION: 99 % | SYSTOLIC BLOOD PRESSURE: 124 MMHG | HEART RATE: 84 BPM

## 2023-10-20 VITALS
HEART RATE: 86 BPM | DIASTOLIC BLOOD PRESSURE: 86 MMHG | SYSTOLIC BLOOD PRESSURE: 139 MMHG | OXYGEN SATURATION: 100 % | RESPIRATION RATE: 14 BRPM | TEMPERATURE: 97 F

## 2023-10-20 VITALS — HEART RATE: 98 BPM | DIASTOLIC BLOOD PRESSURE: 74 MMHG | SYSTOLIC BLOOD PRESSURE: 136 MMHG

## 2023-10-20 VITALS
HEART RATE: 94 BPM | OXYGEN SATURATION: 97 % | DIASTOLIC BLOOD PRESSURE: 81 MMHG | RESPIRATION RATE: 18 BRPM | SYSTOLIC BLOOD PRESSURE: 134 MMHG

## 2023-10-20 VITALS — OXYGEN SATURATION: 99 % | HEART RATE: 90 BPM | SYSTOLIC BLOOD PRESSURE: 126 MMHG | DIASTOLIC BLOOD PRESSURE: 81 MMHG

## 2023-10-20 VITALS — SYSTOLIC BLOOD PRESSURE: 138 MMHG | HEART RATE: 108 BPM | DIASTOLIC BLOOD PRESSURE: 96 MMHG

## 2023-10-20 VITALS
RESPIRATION RATE: 18 BRPM | OXYGEN SATURATION: 100 % | TEMPERATURE: 97.5 F | SYSTOLIC BLOOD PRESSURE: 122 MMHG | HEART RATE: 76 BPM | DIASTOLIC BLOOD PRESSURE: 77 MMHG

## 2023-10-20 VITALS — DIASTOLIC BLOOD PRESSURE: 94 MMHG | OXYGEN SATURATION: 99 % | HEART RATE: 94 BPM | SYSTOLIC BLOOD PRESSURE: 142 MMHG

## 2023-10-20 VITALS
HEART RATE: 91 BPM | RESPIRATION RATE: 14 BRPM | SYSTOLIC BLOOD PRESSURE: 144 MMHG | DIASTOLIC BLOOD PRESSURE: 88 MMHG | OXYGEN SATURATION: 100 %

## 2023-10-20 VITALS — HEART RATE: 99 BPM | DIASTOLIC BLOOD PRESSURE: 98 MMHG | SYSTOLIC BLOOD PRESSURE: 148 MMHG | OXYGEN SATURATION: 99 %

## 2023-10-20 VITALS
SYSTOLIC BLOOD PRESSURE: 144 MMHG | RESPIRATION RATE: 16 BRPM | DIASTOLIC BLOOD PRESSURE: 97 MMHG | OXYGEN SATURATION: 99 % | HEART RATE: 95 BPM

## 2023-10-20 VITALS
OXYGEN SATURATION: 100 % | TEMPERATURE: 98.24 F | SYSTOLIC BLOOD PRESSURE: 150 MMHG | RESPIRATION RATE: 20 BRPM | HEART RATE: 105 BPM | DIASTOLIC BLOOD PRESSURE: 104 MMHG

## 2023-10-20 VITALS
HEART RATE: 76 BPM | RESPIRATION RATE: 18 BRPM | TEMPERATURE: 97.34 F | DIASTOLIC BLOOD PRESSURE: 85 MMHG | SYSTOLIC BLOOD PRESSURE: 131 MMHG | OXYGEN SATURATION: 100 %

## 2023-10-20 VITALS
SYSTOLIC BLOOD PRESSURE: 147 MMHG | RESPIRATION RATE: 14 BRPM | DIASTOLIC BLOOD PRESSURE: 94 MMHG | HEART RATE: 90 BPM | OXYGEN SATURATION: 100 %

## 2023-10-20 VITALS — DIASTOLIC BLOOD PRESSURE: 94 MMHG | SYSTOLIC BLOOD PRESSURE: 147 MMHG | OXYGEN SATURATION: 100 % | HEART RATE: 90 BPM

## 2023-10-20 VITALS — HEART RATE: 110 BPM | SYSTOLIC BLOOD PRESSURE: 143 MMHG | DIASTOLIC BLOOD PRESSURE: 94 MMHG

## 2023-10-20 VITALS — OXYGEN SATURATION: 100 % | SYSTOLIC BLOOD PRESSURE: 165 MMHG | HEART RATE: 89 BPM | DIASTOLIC BLOOD PRESSURE: 101 MMHG

## 2023-10-20 VITALS — HEART RATE: 104 BPM | OXYGEN SATURATION: 99 % | DIASTOLIC BLOOD PRESSURE: 91 MMHG | SYSTOLIC BLOOD PRESSURE: 139 MMHG

## 2023-10-20 VITALS
HEART RATE: 82 BPM | OXYGEN SATURATION: 100 % | TEMPERATURE: 97.3 F | RESPIRATION RATE: 18 BRPM | SYSTOLIC BLOOD PRESSURE: 121 MMHG | DIASTOLIC BLOOD PRESSURE: 72 MMHG

## 2023-10-20 VITALS
RESPIRATION RATE: 17 BRPM | SYSTOLIC BLOOD PRESSURE: 147 MMHG | OXYGEN SATURATION: 100 % | DIASTOLIC BLOOD PRESSURE: 107 MMHG | HEART RATE: 83 BPM

## 2023-10-20 VITALS
SYSTOLIC BLOOD PRESSURE: 124 MMHG | RESPIRATION RATE: 18 BRPM | DIASTOLIC BLOOD PRESSURE: 77 MMHG | HEART RATE: 84 BPM | OXYGEN SATURATION: 99 %

## 2023-10-20 VITALS — HEART RATE: 97 BPM | OXYGEN SATURATION: 99 % | SYSTOLIC BLOOD PRESSURE: 144 MMHG | DIASTOLIC BLOOD PRESSURE: 92 MMHG

## 2023-10-20 VITALS
DIASTOLIC BLOOD PRESSURE: 58 MMHG | OXYGEN SATURATION: 99 % | HEART RATE: 84 BPM | RESPIRATION RATE: 17 BRPM | SYSTOLIC BLOOD PRESSURE: 134 MMHG

## 2023-10-20 VITALS — SYSTOLIC BLOOD PRESSURE: 140 MMHG | DIASTOLIC BLOOD PRESSURE: 95 MMHG | HEART RATE: 109 BPM

## 2023-10-20 VITALS
OXYGEN SATURATION: 100 % | DIASTOLIC BLOOD PRESSURE: 94 MMHG | RESPIRATION RATE: 18 BRPM | SYSTOLIC BLOOD PRESSURE: 151 MMHG | HEART RATE: 74 BPM

## 2023-10-20 VITALS — OXYGEN SATURATION: 99 % | SYSTOLIC BLOOD PRESSURE: 134 MMHG | DIASTOLIC BLOOD PRESSURE: 97 MMHG | HEART RATE: 80 BPM

## 2023-10-20 VITALS — DIASTOLIC BLOOD PRESSURE: 92 MMHG | SYSTOLIC BLOOD PRESSURE: 140 MMHG | HEART RATE: 93 BPM | OXYGEN SATURATION: 99 %

## 2023-10-20 VITALS — OXYGEN SATURATION: 100 % | DIASTOLIC BLOOD PRESSURE: 87 MMHG | HEART RATE: 88 BPM | SYSTOLIC BLOOD PRESSURE: 142 MMHG

## 2023-10-20 VITALS — OXYGEN SATURATION: 100 % | HEART RATE: 104 BPM | DIASTOLIC BLOOD PRESSURE: 84 MMHG | SYSTOLIC BLOOD PRESSURE: 131 MMHG

## 2023-10-20 VITALS
DIASTOLIC BLOOD PRESSURE: 82 MMHG | RESPIRATION RATE: 16 BRPM | OXYGEN SATURATION: 99 % | SYSTOLIC BLOOD PRESSURE: 130 MMHG | HEART RATE: 73 BPM

## 2023-10-20 VITALS — OXYGEN SATURATION: 100 % | DIASTOLIC BLOOD PRESSURE: 93 MMHG | HEART RATE: 90 BPM | SYSTOLIC BLOOD PRESSURE: 144 MMHG

## 2023-10-20 VITALS
RESPIRATION RATE: 17 BRPM | OXYGEN SATURATION: 100 % | DIASTOLIC BLOOD PRESSURE: 91 MMHG | SYSTOLIC BLOOD PRESSURE: 142 MMHG | HEART RATE: 85 BPM

## 2023-10-20 VITALS — SYSTOLIC BLOOD PRESSURE: 137 MMHG | HEART RATE: 108 BPM | DIASTOLIC BLOOD PRESSURE: 92 MMHG

## 2023-10-20 VITALS
HEART RATE: 74 BPM | OXYGEN SATURATION: 99 % | RESPIRATION RATE: 18 BRPM | DIASTOLIC BLOOD PRESSURE: 78 MMHG | SYSTOLIC BLOOD PRESSURE: 129 MMHG

## 2023-10-20 VITALS
RESPIRATION RATE: 17 BRPM | OXYGEN SATURATION: 98 % | SYSTOLIC BLOOD PRESSURE: 129 MMHG | TEMPERATURE: 97.5 F | HEART RATE: 88 BPM | DIASTOLIC BLOOD PRESSURE: 76 MMHG

## 2023-10-20 VITALS
SYSTOLIC BLOOD PRESSURE: 131 MMHG | RESPIRATION RATE: 16 BRPM | HEART RATE: 91 BPM | OXYGEN SATURATION: 100 % | DIASTOLIC BLOOD PRESSURE: 84 MMHG

## 2023-10-20 VITALS — HEART RATE: 86 BPM | DIASTOLIC BLOOD PRESSURE: 84 MMHG | SYSTOLIC BLOOD PRESSURE: 144 MMHG

## 2023-10-20 VITALS — SYSTOLIC BLOOD PRESSURE: 132 MMHG | DIASTOLIC BLOOD PRESSURE: 78 MMHG | HEART RATE: 78 BPM

## 2023-10-20 VITALS
OXYGEN SATURATION: 100 % | RESPIRATION RATE: 16 BRPM | DIASTOLIC BLOOD PRESSURE: 84 MMHG | SYSTOLIC BLOOD PRESSURE: 144 MMHG | HEART RATE: 87 BPM | TEMPERATURE: 96.98 F

## 2023-10-20 VITALS — SYSTOLIC BLOOD PRESSURE: 137 MMHG | DIASTOLIC BLOOD PRESSURE: 95 MMHG | HEART RATE: 93 BPM

## 2023-10-20 VITALS
TEMPERATURE: 97.88 F | SYSTOLIC BLOOD PRESSURE: 144 MMHG | DIASTOLIC BLOOD PRESSURE: 88 MMHG | HEART RATE: 93 BPM | RESPIRATION RATE: 17 BRPM | OXYGEN SATURATION: 100 %

## 2023-10-20 VITALS
SYSTOLIC BLOOD PRESSURE: 154 MMHG | DIASTOLIC BLOOD PRESSURE: 96 MMHG | HEART RATE: 75 BPM | OXYGEN SATURATION: 98 % | RESPIRATION RATE: 18 BRPM

## 2023-10-20 VITALS
HEART RATE: 87 BPM | SYSTOLIC BLOOD PRESSURE: 143 MMHG | OXYGEN SATURATION: 100 % | DIASTOLIC BLOOD PRESSURE: 86 MMHG | RESPIRATION RATE: 17 BRPM

## 2023-10-20 VITALS
SYSTOLIC BLOOD PRESSURE: 177 MMHG | DIASTOLIC BLOOD PRESSURE: 84 MMHG | RESPIRATION RATE: 20 BRPM | OXYGEN SATURATION: 100 % | HEART RATE: 96 BPM

## 2023-10-20 VITALS — HEART RATE: 101 BPM | SYSTOLIC BLOOD PRESSURE: 126 MMHG | DIASTOLIC BLOOD PRESSURE: 78 MMHG

## 2023-10-20 VITALS — DIASTOLIC BLOOD PRESSURE: 88 MMHG | HEART RATE: 93 BPM | OXYGEN SATURATION: 100 % | SYSTOLIC BLOOD PRESSURE: 144 MMHG

## 2023-10-20 VITALS
RESPIRATION RATE: 17 BRPM | DIASTOLIC BLOOD PRESSURE: 79 MMHG | HEART RATE: 90 BPM | OXYGEN SATURATION: 96 % | SYSTOLIC BLOOD PRESSURE: 134 MMHG

## 2023-10-20 VITALS
HEART RATE: 83 BPM | RESPIRATION RATE: 17 BRPM | SYSTOLIC BLOOD PRESSURE: 147 MMHG | OXYGEN SATURATION: 98 % | DIASTOLIC BLOOD PRESSURE: 107 MMHG

## 2023-10-20 VITALS — DIASTOLIC BLOOD PRESSURE: 109 MMHG | OXYGEN SATURATION: 100 % | HEART RATE: 94 BPM | SYSTOLIC BLOOD PRESSURE: 171 MMHG

## 2023-10-20 VITALS — SYSTOLIC BLOOD PRESSURE: 137 MMHG | DIASTOLIC BLOOD PRESSURE: 85 MMHG | HEART RATE: 85 BPM | OXYGEN SATURATION: 98 %

## 2023-10-20 VITALS — SYSTOLIC BLOOD PRESSURE: 136 MMHG | DIASTOLIC BLOOD PRESSURE: 94 MMHG | OXYGEN SATURATION: 99 % | HEART RATE: 107 BPM

## 2023-10-20 VITALS — HEART RATE: 99 BPM | DIASTOLIC BLOOD PRESSURE: 99 MMHG | SYSTOLIC BLOOD PRESSURE: 148 MMHG | OXYGEN SATURATION: 99 %

## 2023-10-20 VITALS — SYSTOLIC BLOOD PRESSURE: 144 MMHG | OXYGEN SATURATION: 100 % | DIASTOLIC BLOOD PRESSURE: 90 MMHG | HEART RATE: 92 BPM

## 2023-10-20 VITALS — SYSTOLIC BLOOD PRESSURE: 157 MMHG | DIASTOLIC BLOOD PRESSURE: 102 MMHG | HEART RATE: 103 BPM

## 2023-10-20 VITALS
SYSTOLIC BLOOD PRESSURE: 130 MMHG | RESPIRATION RATE: 16 BRPM | DIASTOLIC BLOOD PRESSURE: 78 MMHG | OXYGEN SATURATION: 98 % | HEART RATE: 84 BPM

## 2023-10-20 VITALS
TEMPERATURE: 98.9 F | HEART RATE: 72 BPM | OXYGEN SATURATION: 98 % | SYSTOLIC BLOOD PRESSURE: 95 MMHG | DIASTOLIC BLOOD PRESSURE: 56 MMHG

## 2023-10-20 VITALS — HEART RATE: 107 BPM | DIASTOLIC BLOOD PRESSURE: 80 MMHG | SYSTOLIC BLOOD PRESSURE: 127 MMHG

## 2023-10-20 VITALS — SYSTOLIC BLOOD PRESSURE: 148 MMHG | DIASTOLIC BLOOD PRESSURE: 91 MMHG | OXYGEN SATURATION: 99 % | HEART RATE: 93 BPM

## 2023-10-20 VITALS — DIASTOLIC BLOOD PRESSURE: 88 MMHG | SYSTOLIC BLOOD PRESSURE: 139 MMHG | HEART RATE: 90 BPM | OXYGEN SATURATION: 99 %

## 2023-10-20 VITALS
RESPIRATION RATE: 17 BRPM | SYSTOLIC BLOOD PRESSURE: 77 MMHG | DIASTOLIC BLOOD PRESSURE: 66 MMHG | HEART RATE: 84 BPM | OXYGEN SATURATION: 100 %

## 2023-10-20 VITALS
OXYGEN SATURATION: 100 % | SYSTOLIC BLOOD PRESSURE: 118 MMHG | DIASTOLIC BLOOD PRESSURE: 72 MMHG | RESPIRATION RATE: 18 BRPM | TEMPERATURE: 97.3 F | HEART RATE: 84 BPM

## 2023-10-20 VITALS — DIASTOLIC BLOOD PRESSURE: 86 MMHG | OXYGEN SATURATION: 100 % | HEART RATE: 86 BPM | SYSTOLIC BLOOD PRESSURE: 139 MMHG

## 2023-10-20 VITALS
SYSTOLIC BLOOD PRESSURE: 123 MMHG | HEART RATE: 85 BPM | OXYGEN SATURATION: 98 % | RESPIRATION RATE: 17 BRPM | DIASTOLIC BLOOD PRESSURE: 75 MMHG

## 2023-10-20 VITALS
DIASTOLIC BLOOD PRESSURE: 116 MMHG | HEART RATE: 87 BPM | OXYGEN SATURATION: 100 % | RESPIRATION RATE: 17 BRPM | SYSTOLIC BLOOD PRESSURE: 168 MMHG

## 2023-10-20 VITALS
TEMPERATURE: 98.9 F | DIASTOLIC BLOOD PRESSURE: 58 MMHG | OXYGEN SATURATION: 98 % | SYSTOLIC BLOOD PRESSURE: 102 MMHG | HEART RATE: 98 BPM

## 2023-10-20 VITALS — DIASTOLIC BLOOD PRESSURE: 93 MMHG | SYSTOLIC BLOOD PRESSURE: 162 MMHG | HEART RATE: 108 BPM

## 2023-10-20 VITALS
SYSTOLIC BLOOD PRESSURE: 144 MMHG | HEART RATE: 90 BPM | RESPIRATION RATE: 14 BRPM | DIASTOLIC BLOOD PRESSURE: 90 MMHG | OXYGEN SATURATION: 100 %

## 2023-10-20 VITALS — DIASTOLIC BLOOD PRESSURE: 69 MMHG | HEART RATE: 92 BPM | SYSTOLIC BLOOD PRESSURE: 124 MMHG

## 2023-10-20 VITALS
DIASTOLIC BLOOD PRESSURE: 91 MMHG | RESPIRATION RATE: 17 BRPM | OXYGEN SATURATION: 99 % | TEMPERATURE: 97.8 F | HEART RATE: 96 BPM | SYSTOLIC BLOOD PRESSURE: 146 MMHG

## 2023-10-20 VITALS — HEART RATE: 93 BPM | DIASTOLIC BLOOD PRESSURE: 116 MMHG | SYSTOLIC BLOOD PRESSURE: 166 MMHG | OXYGEN SATURATION: 100 %

## 2023-10-20 VITALS — DIASTOLIC BLOOD PRESSURE: 98 MMHG | SYSTOLIC BLOOD PRESSURE: 150 MMHG

## 2023-10-20 VITALS
SYSTOLIC BLOOD PRESSURE: 131 MMHG | RESPIRATION RATE: 18 BRPM | DIASTOLIC BLOOD PRESSURE: 92 MMHG | HEART RATE: 76 BPM | OXYGEN SATURATION: 98 %

## 2023-10-20 VITALS — DIASTOLIC BLOOD PRESSURE: 87 MMHG | OXYGEN SATURATION: 99 % | SYSTOLIC BLOOD PRESSURE: 141 MMHG | HEART RATE: 88 BPM

## 2023-10-20 VITALS — DIASTOLIC BLOOD PRESSURE: 92 MMHG | HEART RATE: 100 BPM | SYSTOLIC BLOOD PRESSURE: 140 MMHG

## 2023-10-20 VITALS — HEART RATE: 100 BPM | DIASTOLIC BLOOD PRESSURE: 73 MMHG | SYSTOLIC BLOOD PRESSURE: 126 MMHG

## 2023-10-20 VITALS
RESPIRATION RATE: 16 BRPM | HEART RATE: 86 BPM | SYSTOLIC BLOOD PRESSURE: 150 MMHG | OXYGEN SATURATION: 100 % | DIASTOLIC BLOOD PRESSURE: 93 MMHG

## 2023-10-20 VITALS — HEART RATE: 92 BPM | SYSTOLIC BLOOD PRESSURE: 121 MMHG | DIASTOLIC BLOOD PRESSURE: 66 MMHG

## 2023-10-20 VITALS — DIASTOLIC BLOOD PRESSURE: 80 MMHG | SYSTOLIC BLOOD PRESSURE: 133 MMHG | HEART RATE: 105 BPM

## 2023-10-20 VITALS — DIASTOLIC BLOOD PRESSURE: 90 MMHG | SYSTOLIC BLOOD PRESSURE: 150 MMHG

## 2023-10-20 LAB
ALBUMIN LEVEL: 2.2 G/DL (ref 3.5–5)
ALBUMIN LEVEL: 2.9 G/DL (ref 3.5–5)
ALBUMIN/GLOB SERPL: 0.8 {RATIO} (ref 1.1–1.8)
ALBUMIN/GLOB SERPL: 0.8 {RATIO} (ref 1.1–1.8)
ALP ISO SERPL-ACNC: 222 U/L (ref 38–126)
ALP ISO SERPL-ACNC: 305 U/L (ref 38–126)
ALT SERPLBLD-CCNC: 19 U/L (ref 12–78)
ANION GAP SERPL CALC-SCNC: 12 MEQ/L (ref 5–15)
ANION GAP SERPL CALC-SCNC: 13 MEQ/L (ref 5–15)
ANION GAP SERPL CALC-SCNC: 9.5 MEQ/L (ref 5–15)
APTT PPP: 23.9 SECONDS (ref 22.8–30.6)
APTT PPP: 24.5 SECONDS (ref 22.8–30.6)
AST SERPL QL: 34 U/L (ref 14–36)
AST SERPL QL: 34 U/L (ref 14–36)
AST SERPL QL: 45 U/L (ref 14–36)
BILIRUBIN,TOTAL: 0.2 MG/DL (ref 0.2–1.3)
BILIRUBIN,TOTAL: 0.6 MG/DL (ref 0.2–1.3)
BUN SERPL-MCNC: 10 MG/DL (ref 7–17)
BUN SERPL-MCNC: 11 MG/DL (ref 7–17)
BUN SERPL-MCNC: 11 MG/DL (ref 7–17)
CALCIUM SPEC-MCNC: 6.9 MG/DL (ref 8.4–10.2)
CALCIUM SPEC-MCNC: 8.5 MG/DL (ref 8.4–10.2)
CALCIUM SPEC-MCNC: 8.6 MG/DL (ref 8.4–10.2)
CELLS COUNTED: 100
CHLORIDE SPEC-SCNC: 107 MMOL/L (ref 98–107)
CHLORIDE SPEC-SCNC: 108 MMOL/L (ref 98–107)
CHLORIDE SPEC-SCNC: 108 MMOL/L (ref 98–107)
CO2 SERPL-SCNC: 17 MMOL/L (ref 22–30)
CREAT BLD-SCNC: 0.7 MG/DL (ref 0.52–1.04)
CREATININE CLEARANCE ESTIMATED: 129 ML/MIN (ref 50–200)
D-DIMER: 2.18 UG/ML (ref 0–0.5)
ESTIMATED GLOMERULAR FILT RATE: 106 ML/MIN (ref 60–?)
FIBRINOGEN PPP-MCNC: 345 MG/DL (ref 229.9–363.5)
FIBRINOGEN PPP-MCNC: 362 MG/DL (ref 229.9–363.5)
FIBRINOGEN PPP-MCNC: 515 MG/DL (ref 229.9–363.5)
GFR (AFRICAN AMERICAN): 128 ML/MIN (ref 60–?)
GLOBULIN SER CALC-MCNC: 2.7 G/DL (ref 1.3–3.2)
GLOBULIN SER CALC-MCNC: 3.6 G/DL (ref 1.3–3.2)
GLUCOSE: 104 MG/DL (ref 74–100)
GLUCOSE: 85 MG/DL (ref 74–100)
GLUCOSE: 85 MG/DL (ref 74–100)
HCT VFR BLD CALC: (no result) % (ref 37–47)
HCT VFR BLD CALC: 31.9 % (ref 37–47)
HCT VFR BLD CALC: 33.3 % (ref 37–47)
HGB BLD-MCNC: (no result) G/DL (ref 12.2–16.2)
HGB BLD-MCNC: 10.6 G/DL (ref 12.2–16.2)
HGB BLD-MCNC: 10.8 G/DL (ref 12.2–16.2)
HYPOCHROMIA BLD QL: (no result)
INR PPP: 0.88 (ref 0.9–1.1)
INR PPP: 0.89 (ref 0.9–1.1)
MAGNESIUM: 9.1 MG/DL (ref 1.6–2.3)
MANUAL DIFFERENTIAL: (no result)
MCHC RBC-ENTMCNC: (no result) G/DL (ref 31.8–35.4)
MCHC RBC-ENTMCNC: 32.6 G/DL (ref 31.8–35.4)
MCHC RBC-ENTMCNC: 33.3 G/DL (ref 31.8–35.4)
MCV RBC: (no result) FL (ref 81–99)
MCV RBC: 76.2 FL (ref 81–99)
MCV RBC: 77.6 FL (ref 81–99)
MEAN CORPUSCULAR HEMOGLOBIN: (no result) PG (ref 27–31.2)
MEAN CORPUSCULAR HEMOGLOBIN: 24.8 PG (ref 27–31.2)
MEAN CORPUSCULAR HEMOGLOBIN: 25.8 PG (ref 27–31.2)
PLATELET # BLD: (no result) K/MM3 (ref 142–424)
PLATELET # BLD: 123 K/MM3 (ref 142–424)
PLATELET # BLD: 130 K/MM3 (ref 142–424)
POTASSIUM: 4 MMOL/L (ref 3.5–5.1)
POTASSIUM: 4 MMOL/L (ref 3.5–5.1)
POTASSIUM: 4.5 MMOL/L (ref 3.5–5.1)
PROT SERPL-MCNC: 4.9 G/DL (ref 6.3–8.2)
PROT SERPL-MCNC: 6.5 G/DL (ref 6.3–8.2)
PT BLD: 9.6 SECONDS (ref 10.1–12.5)
PT BLD: 9.7 SECONDS (ref 10.1–12.5)
RBC # BLD AUTO: (no result) M/MM3 (ref 4.2–5.4)
RBC # BLD AUTO: 4.11 M/MM3 (ref 4.2–5.4)
RBC # BLD AUTO: 4.37 M/MM3 (ref 4.2–5.4)
SODIUM SPEC-SCNC: 129 MMOL/L (ref 136–145)
SODIUM SPEC-SCNC: 133 MMOL/L (ref 136–145)
SODIUM SPEC-SCNC: 134 MMOL/L (ref 136–145)
URATE SERPL-SCNC: 6.6 MG/DL (ref 2.5–6.2)
WBC # BLD AUTO: (no result) K/MM3 (ref 4.8–10.8)
WBC # BLD AUTO: 16.9 K/MM3 (ref 4.8–10.8)
WBC # BLD AUTO: 20.6 K/MM3 (ref 4.8–10.8)

## 2023-10-20 NOTE — SUR.PHASEI
0913- this rn goes to patient bedside to do q10min fundal check. When pulling back sheets, large clots and pool of blood noted under pt. Pt was firm during rub, but large clots were still coming out.  MD Sams notified immediately. T.O. for 1gram of

## 2023-10-21 VITALS — RESPIRATION RATE: 15 BRPM | HEART RATE: 91 BPM | SYSTOLIC BLOOD PRESSURE: 130 MMHG | DIASTOLIC BLOOD PRESSURE: 84 MMHG

## 2023-10-21 VITALS — OXYGEN SATURATION: 100 % | DIASTOLIC BLOOD PRESSURE: 75 MMHG | SYSTOLIC BLOOD PRESSURE: 146 MMHG | HEART RATE: 90 BPM

## 2023-10-21 VITALS
SYSTOLIC BLOOD PRESSURE: 138 MMHG | RESPIRATION RATE: 17 BRPM | TEMPERATURE: 98.78 F | DIASTOLIC BLOOD PRESSURE: 72 MMHG | HEART RATE: 97 BPM | OXYGEN SATURATION: 100 %

## 2023-10-21 VITALS — SYSTOLIC BLOOD PRESSURE: 157 MMHG | OXYGEN SATURATION: 100 % | HEART RATE: 90 BPM | DIASTOLIC BLOOD PRESSURE: 82 MMHG

## 2023-10-21 VITALS
RESPIRATION RATE: 17 BRPM | TEMPERATURE: 99.14 F | HEART RATE: 93 BPM | SYSTOLIC BLOOD PRESSURE: 121 MMHG | DIASTOLIC BLOOD PRESSURE: 80 MMHG | OXYGEN SATURATION: 100 %

## 2023-10-21 VITALS
RESPIRATION RATE: 15 BRPM | TEMPERATURE: 99.2 F | HEART RATE: 87 BPM | SYSTOLIC BLOOD PRESSURE: 119 MMHG | DIASTOLIC BLOOD PRESSURE: 68 MMHG

## 2023-10-21 VITALS — RESPIRATION RATE: 15 BRPM | SYSTOLIC BLOOD PRESSURE: 128 MMHG | DIASTOLIC BLOOD PRESSURE: 76 MMHG | HEART RATE: 90 BPM

## 2023-10-21 VITALS — HEART RATE: 90 BPM | OXYGEN SATURATION: 100 % | DIASTOLIC BLOOD PRESSURE: 87 MMHG | SYSTOLIC BLOOD PRESSURE: 148 MMHG

## 2023-10-21 VITALS — SYSTOLIC BLOOD PRESSURE: 139 MMHG | DIASTOLIC BLOOD PRESSURE: 83 MMHG | HEART RATE: 86 BPM

## 2023-10-21 VITALS — SYSTOLIC BLOOD PRESSURE: 158 MMHG | OXYGEN SATURATION: 100 % | DIASTOLIC BLOOD PRESSURE: 86 MMHG | HEART RATE: 93 BPM

## 2023-10-21 VITALS — DIASTOLIC BLOOD PRESSURE: 85 MMHG | SYSTOLIC BLOOD PRESSURE: 136 MMHG | HEART RATE: 87 BPM

## 2023-10-21 LAB
CELLS COUNTED: 100
DOHLE BOD BLD QL SMEAR: (no result)
HCT VFR BLD CALC: 23.8 % (ref 37–47)
HGB BLD-MCNC: 8.2 G/DL (ref 12.2–16.2)
HYPOCHROMIA BLD QL: (no result)
MANUAL DIFFERENTIAL: (no result)
MCHC RBC-ENTMCNC: 34.6 G/DL (ref 31.8–35.4)
MCV RBC: 77.6 FL (ref 81–99)
MEAN CORPUSCULAR HEMOGLOBIN: 26.8 PG (ref 27–31.2)
PLATELET # BLD: 120 K/MM3 (ref 142–424)
RBC # BLD AUTO: 3.06 M/MM3 (ref 4.2–5.4)
WBC # BLD AUTO: 19 K/MM3 (ref 4.8–10.8)

## 2023-10-22 VITALS
HEART RATE: 88 BPM | SYSTOLIC BLOOD PRESSURE: 141 MMHG | RESPIRATION RATE: 18 BRPM | DIASTOLIC BLOOD PRESSURE: 79 MMHG | TEMPERATURE: 99.14 F | OXYGEN SATURATION: 100 %

## 2023-10-22 VITALS
DIASTOLIC BLOOD PRESSURE: 75 MMHG | HEART RATE: 93 BPM | OXYGEN SATURATION: 98 % | TEMPERATURE: 99.32 F | RESPIRATION RATE: 17 BRPM | SYSTOLIC BLOOD PRESSURE: 129 MMHG

## 2023-10-22 VITALS
OXYGEN SATURATION: 99 % | DIASTOLIC BLOOD PRESSURE: 79 MMHG | RESPIRATION RATE: 18 BRPM | SYSTOLIC BLOOD PRESSURE: 132 MMHG | HEART RATE: 90 BPM | TEMPERATURE: 99.4 F

## 2023-10-23 VITALS
OXYGEN SATURATION: 98 % | TEMPERATURE: 99.32 F | HEART RATE: 101 BPM | SYSTOLIC BLOOD PRESSURE: 121 MMHG | DIASTOLIC BLOOD PRESSURE: 70 MMHG | RESPIRATION RATE: 17 BRPM

## 2023-10-23 VITALS
DIASTOLIC BLOOD PRESSURE: 107 MMHG | OXYGEN SATURATION: 100 % | SYSTOLIC BLOOD PRESSURE: 147 MMHG | HEART RATE: 83 BPM | RESPIRATION RATE: 17 BRPM

## 2023-10-23 VITALS
HEART RATE: 90 BPM | RESPIRATION RATE: 14 BRPM | OXYGEN SATURATION: 100 % | DIASTOLIC BLOOD PRESSURE: 94 MMHG | SYSTOLIC BLOOD PRESSURE: 147 MMHG

## 2023-10-23 VITALS
TEMPERATURE: 97.9 F | HEART RATE: 102 BPM | OXYGEN SATURATION: 98 % | RESPIRATION RATE: 18 BRPM | DIASTOLIC BLOOD PRESSURE: 76 MMHG | SYSTOLIC BLOOD PRESSURE: 135 MMHG

## 2023-10-23 VITALS
RESPIRATION RATE: 18 BRPM | TEMPERATURE: 98.96 F | SYSTOLIC BLOOD PRESSURE: 153 MMHG | DIASTOLIC BLOOD PRESSURE: 86 MMHG | HEART RATE: 88 BPM | OXYGEN SATURATION: 99 %

## 2023-10-23 VITALS — TEMPERATURE: 98.96 F

## 2023-11-01 ENCOUNTER — TELEPHONE (OUTPATIENT)
Dept: OBSTETRICS AND GYNECOLOGY | Facility: CLINIC | Age: 23
End: 2023-11-01

## 2023-11-01 NOTE — TELEPHONE ENCOUNTER
Caller: Jessica Toussaint    Relationship to patient: Self    Best call back number: [phone] (home)  CAN CALL BACK AND LVM       PT DELIVERED VIA C-SEC ON 10-20 WITH DR. PRAJAPATI [facility].  PT WOULD LIKE TO KNOW IF SHE NEEDS TO FOLLOW UP WITH YOU FOR POSTPARTUM CARE.

## 2024-08-20 NOTE — PROGRESS NOTES
OB FOLLOW UP  CC- Here for care of pregnancy        Jessica Toussaint is a 20 y.o.  31w4d patient being seen today for her obstetrical follow up visit. Patient reports no complaints.     Her prenatal care is complicated by (and status) :    Patient Active Problem List   Diagnosis   • Pregnancy       Flu Status: n/a  Ultrasound Today: No.    ROS -   Patient Reports : No Problems  Patient Denies: Loss of Fluid, Vaginal Spotting, Vision Changes, Headaches, Nausea , Vomiting , Contractions and Epigastric pain  Fetal Movement : normal  All other systems reviewed and are negative.       The additional following portions of the patient's history were reviewed and updated as appropriate: allergies, current medications, past family history, past medical history, past social history, past surgical history and problem list.    I have reviewed and agree with the HPI, ROS, and historical information as entered above. Linda Macias MD    /68   Wt 64.3 kg (141 lb 12.8 oz)       EXAM:     FHT: + BPM   Uterine Size: size less than dates  Pelvic Exam: No    Urine glucose/protein: See prenatal flowsheet       Assessment and Plan    Problem List Items Addressed This Visit     Pregnancy - Primary    Overview     cfDNA low risk, AFP negative         Relevant Orders    POC Glucose, Urine, Qualitative, Dipstick (Completed)    POC Protein, Urine, Qualitative, Dipstick (Completed)    US Ob Follow Up Transabdominal Approach          1. Pregnancy at 31w4d. Check growth next time.   2. Fetal status reassuring.   3. Activity and Exercise discussed.  Return in about 2 weeks (around 2021) for F/U Prenatal, U/S Next Visit.    Linda Macias MD  2021   none

## (undated) DEVICE — ADHS LIQ MASTISOL 2/3ML

## (undated) DEVICE — COATED VICRYL  (POLYGLACTIN 910) SUTURE, VIOLET BRAIDED, STERILE, SYNTHETIC ABSORBABLE SUTURE: Brand: COATED VICRYL

## (undated) DEVICE — SUT VIC 3/0 PS2 27IN J427H

## (undated) DEVICE — MAT PREVALON MOBL TRANSFR AIR WO/PAD 39X80IN

## (undated) DEVICE — SOL IRR H2O BTL 1000ML STRL

## (undated) DEVICE — TRY SPINE BLCK WHITACRE 25G 3X5IN

## (undated) DEVICE — SUT GUT CHRM 1 CTX 36IN 905H

## (undated) DEVICE — GLV SURG BIOGEL LTX PF 7 1/2

## (undated) DEVICE — PK C/SECT 10

## (undated) DEVICE — PROXIMATE RH ROTATING HEAD SKIN STAPLERS (35 WIDE) CONTAINS 35 STAINLESS STEEL STAPLES: Brand: PROXIMATE

## (undated) DEVICE — SUT PLAIN  3/0 CT1 27IN 842H

## (undated) DEVICE — SUT VIC 2/0 CT1 27IN J339H BX/36

## (undated) DEVICE — 3M™ STERI-STRIP™ REINFORCED ADHESIVE SKIN CLOSURES, R1547, 1/2 IN X 4 IN (12 MM X 100 MM), 6 STRIPS/ENVELOPE: Brand: 3M™ STERI-STRIP™

## (undated) DEVICE — SOL IRR NACL 0.9PCT BT 1000ML